# Patient Record
Sex: MALE | Race: AMERICAN INDIAN OR ALASKA NATIVE | NOT HISPANIC OR LATINO | Employment: OTHER | URBAN - METROPOLITAN AREA
[De-identification: names, ages, dates, MRNs, and addresses within clinical notes are randomized per-mention and may not be internally consistent; named-entity substitution may affect disease eponyms.]

---

## 2017-04-14 ENCOUNTER — SLEEP CENTER VISIT (OUTPATIENT)
Dept: SLEEP MEDICINE | Facility: MEDICAL CENTER | Age: 72
End: 2017-04-14
Payer: MEDICARE

## 2017-04-14 VITALS
TEMPERATURE: 97.7 F | WEIGHT: 190 LBS | BODY MASS INDEX: 30.53 KG/M2 | RESPIRATION RATE: 16 BRPM | DIASTOLIC BLOOD PRESSURE: 70 MMHG | OXYGEN SATURATION: 94 % | HEIGHT: 66 IN | HEART RATE: 64 BPM | SYSTOLIC BLOOD PRESSURE: 126 MMHG

## 2017-04-14 DIAGNOSIS — R06.83 SNORING: ICD-10-CM

## 2017-04-14 DIAGNOSIS — G47.30 SLEEP APNEA, UNSPECIFIED TYPE: ICD-10-CM

## 2017-04-14 DIAGNOSIS — G47.10 HYPERSOMNOLENCE: ICD-10-CM

## 2017-04-14 DIAGNOSIS — G47.34 NOCTURNAL HYPOXEMIA: ICD-10-CM

## 2017-04-14 PROCEDURE — 99204 OFFICE O/P NEW MOD 45 MIN: CPT | Performed by: INTERNAL MEDICINE

## 2017-04-14 RX ORDER — ZOLPIDEM TARTRATE 5 MG/1
TABLET ORAL
Qty: 3 TAB | Refills: 0 | Status: SHIPPED | OUTPATIENT
Start: 2017-04-14 | End: 2018-10-11

## 2017-04-14 NOTE — PROGRESS NOTES
CC:  Snoring, nocturnal hypoxemia and daytime somnolence, suspected sleep apnea hypopnea syndrome.    HPI:   Mr. Mata is a 71-year-old man referred by Dr. Fernando Tinoco to assist in evaluation and management of suspected sleep-disordered breathing.    He has a complicated cardiac history. He sustained an apparent myocardial infarction in 1989 and a second event in 2005 complicated by congestive heart failure and the need for a permanent transvenous pacemaker. He had recurring problems with atrial fibrillation and flutter and underwent transvascular ablation in 2016. That procedure was complicated by an episode of acute cholecystitis requiring cholecystectomy. During that hospitalization last year he was found to have low arterial oxygen saturations while sleeping. Nocturnal oximetry on October 20, 2016 demonstrated a lowest arterial oxygen saturation of 81% on room air and he spent about 7% of the night with a saturation below 90%. He was started on supplemental oxygen which he is using each night.    He has a regular sleep schedule at bedtime about 11 PM but he often takes 1-1.5 hours to fall asleep. He describes difficulty relaxing and racing thoughts. He awakens perhaps 3 times on an average night and arises at about 6 AM feeling a bit tired and groggy without regular morning headaches. He apparently does snore and he has some gasping respirations at night. He is sleepy during the day and may doze off while sitting in a chair and reading. He has not yet completed the sleep diary of her Hartley Sleepiness Scale. He drinks caffeinated beverages sparingly and does not use substances to induce sleep or to maintain wakefulness. He does not have symptoms suggesting parasomnia or restless leg syndrome.    His medical history is remarkable for the cardiac issues reviewed above as well as systemic arterial hypertension.        Patient Active Problem List    Diagnosis Date Noted   • AV block, complete (CMS-[facility]) 01/29/2013      Priority: High   • CAD (coronary artery disease) 06/07/2011     Priority: High   • Cardiac pacemaker in situ 06/07/2011     Priority: High   • Essential hypertension, benign 06/07/2011     Priority: High   • Other and unspecified hyperlipidemia 06/07/2011     Priority: High   • Arthritis 01/29/2013     Priority: Low   • BPH (benign prostatic hyperplasia) 01/29/2013     Priority: Low       Past Medical History   Diagnosis Date   • History of MI (myocardial infarction) 2005     Inferior MI   • Hypertension    • Hyperlipidemia    • AV block, complete (CMS-HCC)      Secondary to inferior MI, treated with PPM.   • Arthritis    • CAD (coronary artery disease) July 2012     MPI with infarct with minimal amount of ischemia in inferior wall, LVEF 61%.   • Atrial fibrillation (CMS-HCC)    • Back pain    • Coronary heart disease    • Depression    • Heart attack (CMS-HCC)    • Pneumonia    • Nasal drainage    • Rheumatoid arthritis (CMS-HCC)    • Chickenpox    • Malay measles    • Influenza    • Mumps    • Tonsillitis        Past Surgical History   Procedure Laterality Date   • Lid tightening  10/30/2012     Performed by Kojo Miranda M.D. at [facility]   • Other orthopedic surgery  2000     Back surgery by Dr. Gil.   • Pacemaker insertion  October 2005     Medtronic EnPulse [id number] implanted by [facility] [place].   • Laminotomy     • Pr remv 2nd cataract,corn-scler sectn     • Cholecystectomy     • Prostatectomy robotic     • Urology surgery     • Ventilator continuous         Family History   Problem Relation Age of Onset   • Heart Failure Father    • Sleep Apnea Neg Hx    • Cancer Sister    • Heart Disease Son    • No Known Problems Daughter    • No Known Problems Daughter    • No Known Problems Daughter        Social History     Social History   • Marital Status:      Spouse Name: N/A   • Number of Children: N/A   • Years of Education: N/A     Occupational History   • Not on file.     Social History Main  "Topics   • Smoking status: Never Smoker    • Smokeless tobacco: Never Used   • Alcohol Use: No   • Drug Use: No   • Sexual Activity: Not on file     Other Topics Concern   • Not on file     Social History Narrative       Current Outpatient Prescriptions   Medication Sig Dispense Refill   • Ibuprofen-Diphenhydramine HCl (ADVIL PM) 200-25 MG Cap Take  by mouth.     • zolpidem (AMBIEN) 5 MG Tab Take 1-2 tablets by mouth every evening as needed for insomnia. Bring to sleep study. 3 Tab 0   • lisinopril (PRINIVIL) 10 MG TABS Take 1 Tab by mouth every day. 30 Tab 11   • atorvastatin (LIPITOR) 40 MG TABS Take 1 Tab by mouth every day. 30 Tab 11   • aspirin 81 MG tablet Take 81 mg by mouth every day.     • tamsulosin (FLOMAX) 0.4 MG capsule Take 1 Cap by mouth every day. (Patient not taking: Reported on 4/14/2017) 30 Cap 11     No current facility-administered medications for this visit.    \"CURRENT RX\"      Allergies: Review of patient's allergies indicates no known allergies.      ROS  Positive for the sleep, cardiac, urologic and rheumatologic issues reviewed above. He wears corrective lenses but has no diplopia. He's had some loss of auditory acuity but no chronic rhinitis. He denies exertional dyspnea or regular cough. He's had rare episodes of heartburn without symptoms to suggest gastrointestinal blood loss. He has arthralgias and back pain that may interfere with sleep. All other aspects of the CPT review of systems process are negative.      Physical Exam:   /70 mmHg  Pulse 64  Temp(Src) 36.5 °C (97.7 °F)  Resp 16  Ht 1.676 m (5' 5.98\")  Wt 86.183 kg (190 lb)  BMI 30.68 kg/m2  SpO2 94%. Neck circumference is 18 inches.  Head and neck examination demonstrates no mucosal lesion, purulent drainage or evident polyps. The pharynx is crowded but benign with a Mallampati III presentation. The neck is supple without thyromegaly. On chest examination there are symmetrical bilateral breath sounds without rales, " wheezing or consolidation. On cardiac examination, the apical impulse and heart sounds are normal and the rhythm is regular at this time. There is no murmur, gallop or rub and no jugular venous distention. The abdomen is soft with active bowel sounds and no palpable hepatosplenomegaly, mass, guarding or rebound. The extremities show no clubbing, cyanosis or edema and no signs of deep venous thrombosis. There is no warmth, redness, tenderness or palpable venous cord in the calves. The skin is clear, warm and dry. There is no unusual peripheral lymphadenopathy. Peripheral pulses are palpable in all 4 extremities. On neurologic examination, cranial nerve function is intact, motor tone is symmetrical, and the patient is alert, oriented and responsive.       Problems:  1. Sleep apnea, unspecified type  He presents with snoring, excessive daytime somnolence and documented nocturnal hypoxemia. He has a crowded posterior pharyngeal airway, a neck circumference of 18 inches and a body mass index of 30.7. He has a history of systemic arterial hypertension, coronary artery disease and atrial fibrillation/flutter.  The clinical probability of sleep apnea hypopnea syndrome is significant. Accurate diagnosis and effective treatment are required not only to improve levels of daytime alertness but also to reduce the cardiac and neurologic risks associated with untreated sleep apnea particularly given his cardiac history. We have discussed diagnostic options including in-laboratory, attended polysomnography and home sleep testing. We have also discussed treatment options including airway pressurization, reconstructive otolaryngologic surgery, dental appliances and weight management.    2. Hypersomnolence  Probably related at least in part due to the suspected sleep-disordered breathing. His nightly time in bed is restricted and he does have significant insomnia as well as chronic pain and these problems may interfere with sleep  continuity.    3. Snoring    4. Nocturnal hypoxemia    5. Chronic insomnia, with both sleep onset and sleep maintenance elements.  This will be reassessed after his sleep breathing problems are evaluated and effectively treated.      Plan:   1. Polysomnogram, possible split night study.    2. Return visit afterwards to discuss test results and treatment options.    We appreciate the opportunity to assist in his care.

## 2017-04-14 NOTE — MR AVS SNAPSHOT
"Lorena Mata   2017 2:40 PM   Sleep Center Visit   MRN: [id number]    Department:  [facility]   Dept Phone:  [phone]    Description:  Male : 1945   Provider:  Jordin TIM M.D.           Reason for Visit     New Patient Evaluate BENEDICTO      Allergies as of 2017     No Known Allergies      You were diagnosed with     Sleep apnea, unspecified type   [8661206]       Hypersomnolence   [541878]       Snoring   [874465]       Nocturnal hypoxemia   [727237]         Vital Signs     Blood Pressure Pulse Temperature Respirations Height Weight    126/70 mmHg 64 36.5 °C (97.7 °F) 16 1.676 m (5' 5.98\") 86.183 kg (190 lb)    Body Mass Index Oxygen Saturation Smoking Status             30.68 kg/m2 94% Never Smoker          Basic Information     Date Of Birth Sex Race Ethnicity Preferred Language    1945 Male  or  Non- English      Your appointments     2017  8:00 PM   Sleep Study Diagnostic with SLEEP TECH   [facility] (--)    [address]  [place] [address]   [phone]            2017  1:00 PM   [facility] (--)    [address]  [place] [address]   [phone]              Problem List              ICD-10-CM Priority Class Noted - Resolved    CAD (coronary artery disease) I25.10 High  2011 - Present    Cardiac pacemaker in situ Z95.0 High  2011 - Present    Essential hypertension, benign I10 High  2011 - Present    Other and unspecified hyperlipidemia E78.5 High  2011 - Present    AV block, complete (CMS-HCC) I44.2 High  2013 - Present    Arthritis M19.90 Low  2013 - Present    BPH (benign prostatic hyperplasia) N40.0 Low  2013 - Present      Health Maintenance        Date Due Completion Dates    IMM DTaP/Tdap/Td Vaccine (1 - Tdap) 1966 ---    COLONOSCOPY 1997 ---    IMM ZOSTER " VACCINE 7/4/2007 ---    IMM PNEUMOCOCCAL 65+ (ADULT) LOW/MEDIUM RISK SERIES (1 of 2 - PCV13) 7/4/2012 ---            Current Immunizations     No immunizations on file.      Below and/or attached are the medications your provider expects you to take. Review all of your home medications and newly ordered medications with your provider and/or pharmacist. Follow medication instructions as directed by your provider and/or pharmacist. Please keep your medication list with you and share with your provider. Update the information when medications are discontinued, doses are changed, or new medications (including over-the-counter products) are added; and carry medication information at all times in the event of emergency situations     Allergies:  No Known Allergies          Medications  Valid as of: April 14, 2017 -  3:40 PM    Generic Name Brand Name Tablet Size Instructions for use    Aspirin (Tab) aspirin 81 MG Take 81 mg by mouth every day.        Atorvastatin Calcium (Tab) LIPITOR 40 MG Take 1 Tab by mouth every day.        Ibuprofen-Diphenhydramine HCl (Cap) Ibuprofen-Diphenhydramine HCl 200-25 MG Take  by mouth.        Lisinopril (Tab) PRINIVIL 10 MG Take 1 Tab by mouth every day.        Tamsulosin HCl (Cap) FLOMAX 0.4 MG Take 1 Cap by mouth every day.        Zolpidem Tartrate (Tab) AMBIEN 5 MG Take 1-2 tablets by mouth every evening as needed for insomnia. Bring to sleep study.        .                 Medicines prescribed today were sent to:     None      Medication refill instructions:       If your prescription bottle indicates you have medication refills left, it is not necessary to call your provider’s office. Please contact your pharmacy and they will refill your medication.    If your prescription bottle indicates you do not have any refills left, you may request refills at any time through one of the following ways: The online [employer] system (except Urgent Care), by calling your provider’s office, or by  asking your pharmacy to contact your provider’s office with a refill request. Medication refills are processed only during regular business hours and may not be available until the next business day. Your provider may request additional information or to have a follow-up visit with you prior to refilling your medication.   *Please Note: Medication refills are assigned a new Rx number when refilled electronically. Your pharmacy may indicate that no refills were authorized even though a new prescription for the same medication is available at the pharmacy. Please request the medicine by name with the pharmacy before contacting your provider for a refill.        Your To Do List     Future Labs/Procedures Complete By Expires    POLYSOMNOGRAPHY, 4 OR MORE  As directed 4/14/2018         [employer] Access Code: [email or web address]  Expires: 5/14/2017  3:40 PM    Your email address is not on file at [facility].  Email Addresses are required for you to sign up for [employer], please contact [phone] to verify your personal information and to provide your email address prior to attempting to register for [employer].    [place] [address]    [employer]  A secure, online tool to manage your health information     [employer]’s [employer]® is a secure, online tool that connects you to your personalized health information from the privacy of your home -- day or night - making it very easy for you to manage your healthcare. Once the activation process is completed, you can even access your medical information using the [employer] vandana, which is available for free in the Apple Vandana store or Google Play store.     To learn more about [employer], visit [email or web address]    There are two levels of access available (as shown below):   My Chart Features  [facility] Primary Care Doctor [facility]  Specialists [facility]  Urgent  Care Non-[facility] Primary Care Doctor   Email your healthcare team securely and privately 24/7 X X X     Manage appointments: schedule your next appointment; view details of past/upcoming appointments X      Request prescription refills. X      View recent personal medical records, including lab and immunizations X X X X   View health record, including health history, allergies, medications X X X X   Read reports about your outpatient visits, procedures, consult and ER notes X X X X   See your discharge summary, which is a recap of your hospital and/or ER visit that includes your diagnosis, lab results, and care plan X X  X     How to register for [employer]:  Once your e-mail address has been verified, follow the following steps to sign up for [employer].     1. Go to  [email or web address]  2. Click on the Sign Up Now box, which takes you to the New Member Sign Up page. You will need to provide the following information:  a. Enter your [employer] Access Code exactly as it appears at the top of this page. (You will not need to use this code after you’ve completed the sign-up process. If you do not sign up before the expiration date, you must request a new code.)   b. Enter your date of birth.   c. Enter your home email address.   d. Click Submit, and follow the next screen’s instructions.  3. Create a [employer] ID. This will be your [employer] login ID and cannot be changed, so think of one that is secure and easy to remember.  4. Create a [employer] password. You can change your password at any time.  5. Enter your Password Reset Question and Answer. This can be used at a later time if you forget your password.   6. Enter your e-mail address. This allows you to receive e-mail notifications when new information is available in [employer].  7. Click Sign Up. You can now view your health information.    For assistance activating your [employer] account, call [phone]

## 2017-06-21 ENCOUNTER — SLEEP STUDY (OUTPATIENT)
Dept: SLEEP MEDICINE | Facility: MEDICAL CENTER | Age: 72
End: 2017-06-21
Attending: INTERNAL MEDICINE
Payer: MEDICARE

## 2017-06-21 DIAGNOSIS — G47.34 NOCTURNAL HYPOXEMIA: ICD-10-CM

## 2017-06-21 DIAGNOSIS — G47.30 SLEEP APNEA, UNSPECIFIED TYPE: ICD-10-CM

## 2017-06-21 DIAGNOSIS — R06.83 SNORING: ICD-10-CM

## 2017-06-21 DIAGNOSIS — G47.10 HYPERSOMNOLENCE: ICD-10-CM

## 2017-06-21 PROCEDURE — 95811 POLYSOM 6/>YRS CPAP 4/> PARM: CPT | Performed by: INTERNAL MEDICINE

## 2017-06-21 NOTE — MR AVS SNAPSHOT
Lorena Mata   2017 8:00 PM   Sleep Study   MRN: [id number]    Department:  [facility]   Dept Phone:  [phone]    Description:  Male : 1945   Provider:  Jordin TIM M.D.           Allergies as of 2017     No Known Allergies      You were diagnosed with     Sleep apnea, unspecified type   [2069008]       Hypersomnolence   [877819]       Snoring   [267087]       Nocturnal hypoxemia   [837484]         Vital Signs     Smoking Status                   Never Smoker            Basic Information     Date Of Birth Sex Race Ethnicity Preferred Language    1945 Male  or  Non- English      Your appointments     2017  1:00 PM   Follow UP with  Sissy   [facility] (--)    [address] [place] [address]  [place] [id number] [phone]              Problem List              ICD-10-CM Priority Class Noted - Resolved    CAD (coronary artery disease) I25.10 High  2011 - Present    Cardiac pacemaker in situ Z95.0 High  2011 - Present    Essential hypertension, benign I10 High  2011 - Present    Other and unspecified hyperlipidemia E78.5 High  2011 - Present    AV block, complete (CMS-HCC) I44.2 High  2013 - Present    Arthritis M19.90 Low  2013 - Present    BPH (benign prostatic hyperplasia) N40.0 Low  2013 - Present      Health Maintenance        Date Due Completion Dates    IMM DTaP/Tdap/Td Vaccine (1 - Tdap) 1966 ---    COLONOSCOPY 1997 ---    IMM ZOSTER VACCINE 2007 ---    IMM PNEUMOCOCCAL 65+ (ADULT) LOW/MEDIUM RISK SERIES (1 of 2 - PCV13) 2012 ---            Current Immunizations     No immunizations on file.      Below and/or attached are the medications your provider expects you to take. Review all of your home medications and newly ordered medications with your provider and/or pharmacist. Follow medication instructions as directed by your provider and/or  pharmacist. Please keep your medication list with you and share with your provider. Update the information when medications are discontinued, doses are changed, or new medications (including over-the-counter products) are added; and carry medication information at all times in the event of emergency situations     Allergies:  No Known Allergies          Medications  Valid as of: June 22, 2017 -  6:29 AM    Generic Name Brand Name Tablet Size Instructions for use    Aspirin (Tab) aspirin 81 MG Take 81 mg by mouth every day.        Atorvastatin Calcium (Tab) LIPITOR 40 MG Take 1 Tab by mouth every day.        Ibuprofen-Diphenhydramine HCl (Cap) Ibuprofen-Diphenhydramine HCl 200-25 MG Take  by mouth.        Lisinopril (Tab) PRINIVIL 10 MG Take 1 Tab by mouth every day.        Tamsulosin HCl (Cap) FLOMAX 0.4 MG Take 1 Cap by mouth every day.        Zolpidem Tartrate (Tab) AMBIEN 5 MG Take 1-2 tablets by mouth every evening as needed for insomnia. Bring to sleep study.        .                 Medicines prescribed today were sent to:     None      Medication refill instructions:       If your prescription bottle indicates you have medication refills left, it is not necessary to call your provider’s office. Please contact your pharmacy and they will refill your medication.    If your prescription bottle indicates you do not have any refills left, you may request refills at any time through one of the following ways: The online [employer] system (except Urgent Care), by calling your provider’s office, or by asking your pharmacy to contact your provider’s office with a refill request. Medication refills are processed only during regular business hours and may not be available until the next business day. Your provider may request additional information or to have a follow-up visit with you prior to refilling your medication.   *Please Note: Medication refills are assigned a new Rx number when refilled electronically. Your  pharmacy may indicate that no refills were authorized even though a new prescription for the same medication is available at the pharmacy. Please request the medicine by name with the pharmacy before contacting your provider for a refill.           [employer] Access Code: [email or web address]  Expires: 7/22/2017  6:29 AM    Your email address is not on file at [facility].  Email Addresses are required for you to sign up for [employer], please contact [phone] to verify your personal information and to provide your email address prior to attempting to register for [employer].    Lorena [place] [address]    [employer]  A secure, online tool to manage your health information     [employer]’s [employer]® is a secure, online tool that connects you to your personalized health information from the privacy of your home -- day or night - making it very easy for you to manage your healthcare. Once the activation process is completed, you can even access your medical information using the [employer] vandana, which is available for free in the Apple Vandana store or Google Play store.     To learn more about [employer], visit [email or web address]    There are two levels of access available (as shown below):   My Chart Features  [facility] Primary Care Doctor [facility]  Specialists [facility]  Urgent  Care Non-[facility] Primary Care Doctor   Email your healthcare team securely and privately 24/7 X X X    Manage appointments: schedule your next appointment; view details of past/upcoming appointments X      Request prescription refills. X      View recent personal medical records, including lab and immunizations X X X X   View health record, including health history, allergies, medications X X X X   Read reports about your outpatient visits, procedures, consult and ER notes X X X X   See your discharge summary, which is a recap of your hospital and/or ER visit that includes your diagnosis, lab results, and care plan X X  X     How to register  for [employer]:  Once your e-mail address has been verified, follow the following steps to sign up for [employer].     1. Go to  [email or web address]  2. Click on the Sign Up Now box, which takes you to the New Member Sign Up page. You will need to provide the following information:  a. Enter your [employer] Access Code exactly as it appears at the top of this page. (You will not need to use this code after you’ve completed the sign-up process. If you do not sign up before the expiration date, you must request a new code.)   b. Enter your date of birth.   c. Enter your home email address.   d. Click Submit, and follow the next screen’s instructions.  3. Create a [employer] ID. This will be your [employer] login ID and cannot be changed, so think of one that is secure and easy to remember.  4. Create a [employer] password. You can change your password at any time.  5. Enter your Password Reset Question and Answer. This can be used at a later time if you forget your password.   6. Enter your e-mail address. This allows you to receive e-mail notifications when new information is available in [employer].  7. Click Sign Up. You can now view your health information.    For assistance activating your [employer] account, call [phone]

## 2017-06-22 ENCOUNTER — SLEEP CENTER VISIT (OUTPATIENT)
Dept: SLEEP MEDICINE | Facility: MEDICAL CENTER | Age: 72
End: 2017-06-22
Payer: MEDICARE

## 2017-06-22 VITALS
TEMPERATURE: 97.3 F | SYSTOLIC BLOOD PRESSURE: 124 MMHG | BODY MASS INDEX: 30.53 KG/M2 | HEART RATE: 62 BPM | DIASTOLIC BLOOD PRESSURE: 70 MMHG | WEIGHT: 190 LBS | OXYGEN SATURATION: 94 % | RESPIRATION RATE: 16 BRPM | HEIGHT: 66 IN

## 2017-06-22 DIAGNOSIS — G47.10 HYPERSOMNOLENCE: ICD-10-CM

## 2017-06-22 DIAGNOSIS — F51.04 CHRONIC INSOMNIA: ICD-10-CM

## 2017-06-22 DIAGNOSIS — G47.34 NOCTURNAL HYPOXEMIA: ICD-10-CM

## 2017-06-22 DIAGNOSIS — G47.33 OBSTRUCTIVE SLEEP APNEA SYNDROME: ICD-10-CM

## 2017-06-22 DIAGNOSIS — R06.83 SNORING: ICD-10-CM

## 2017-06-22 PROCEDURE — 99214 OFFICE O/P EST MOD 30 MIN: CPT | Performed by: INTERNAL MEDICINE

## 2017-06-22 RX ORDER — ZOLPIDEM TARTRATE 5 MG/1
5 TABLET ORAL NIGHTLY PRN
Qty: 30 TAB | Refills: 1 | Status: SHIPPED | OUTPATIENT
Start: 2017-06-22 | End: 2018-10-11

## 2017-06-22 NOTE — PROCEDURES
CLINICAL COMMENTS:  The patient underwent a split night polysomnogram with a CPAP titration using the standard montage for measurement of parameters of sleep, respiratory events, movement abnormalities, heart rate and rhythm. A microphone was used to monitor snoring.    ANALYSIS: The diagnostic recording time was 164.8 minutes with a sleep period of 159.5 minutes.  Total sleep time was 148.5 minutes with a sleep efficiency of 90.1%.  The sleep latency was 5.4 minutes, and REM latency was 118.0 minutes.  The patient had 71 arousals in total, for an arousal index of 28.7.        RESPIRATORY: The patient had 90 apneas in total.  Of these, 28 were obstructive apneas, and 62 were central apneas.  This resulted in an apnea index (AI) of 36.4.  The patient had 94 hypopneas in total, which resulted in a hypopnea index of 38.0.  The overall AHI was 74.4, while the AHI during REM was 10.9.  The supine AHI = 144.0.    OXIMETRY: Oxygen saturation monitoring showed a mean SpO2 of 91.8% for the diagnostic part of the study, with a minimum oxygen saturation of 83.0%.  Oxygen saturations were below 89% for 6.5% of sleep time.    CARDIAC: The highest heart rate for the first part of the study was 73.0 beats per minute.  The average heart rate during sleep was 59.8 bpm, while the highest heart rate was 69.0 bpm.    LIMB MOVEMENTS: There were a total of 56 periodic limb movements during sleep, of which 3 were PLMS arousals.  This resulted in a PLMS index of 22.6 and a PLMS arousal index of 1.2.    TREATMENT    Treatment recording time was 262.0 minutes with a total sleep time of 257.0min.  The patient had an arousal index of 9.8.      RESPIRATORY: The patient had 0 obstructive apneas, 3 central apneas, and 38 hypopneas for an overall AHI was 9.6.    OXIMETRY: The mean SpO2 during treatment was 91.3%, with a minimum oxygen saturation of 87.0%.        Interpretation:    Mr. Mata presented with snoring and excessive daytime  somnolence. He has a history of atrial fibrillation and flutter with a pacemaker as well as a history of coronary artery disease. This is a split-night study.    In the diagnostic phase there is mild fragmentation of sleep with a normal sleep efficiency but an elevated arousal and awakening index. Significant REM sleep time was recorded. There are periodic limb movements but the periodic limb movement with arousal index was only 1.2 events per hour. The apnea hypopnea index is 74.4 events per hour, including hypopnea and central apnea episodes with some obstructive apneas as well. The index climbs to 144 events per hour during supine sleep. The lowest arterial oxygen saturation is 83% on room air and he spends about 23% of the diagnostic time with a saturation below 90%.    In the treatment phase of the study there is improvement in sleep continuity and the periodic limb movements disappear. CPAP was adjusted across a pressure range of 4-9 cm water. Central apnea episodes resolved in the latter stages of the pressure titration. Scattered hypopnea episodes were noted during supine and REM sleep throughout the titration. At a CPAP pressure of 8 cm water the apnea hypopnea index was 3.5 events per hour with a mean arterial oxygen saturation of 92% and a minimum saturation of 87%. That stage in the titration included 34 minutes of REM sleep time, 86 minutes of non-REM sleep time, and was done in the supine body position.    Assessment:  Very severe sleep apnea hypopnea with an apnea hypopnea index of 74.4 events per hour, including a significant number of central apnea episodes. Significant nocturnal hypoxemia with a lowest arterial oxygen saturation of 83% on room air. Mild fragmentation of sleep, related in part to the breathing events, and improved on treatment. There is an excellent response to CPAP therapy.    Recommendations:  CPAP at 8-9 cm water pressure should be effective. With the persisting occasional  hypopnea episodes in the final pressure stage, auto titrating CPAP with a pressure range of perhaps 7-12 cm water might be considered as an alternative. He did best with a medium Eson mask and heated humidification.

## 2017-06-22 NOTE — MR AVS SNAPSHOT
"Lorena Mata   2017 1:00 PM   Sleep Center Visit   MRN: [id number]    Department:  [facility]   Dept Phone:  [phone]    Description:  Male : 1945   Provider:  Jordin TIM M.D.           Reason for Visit     Results Split-Night Sleep Study      Allergies as of 2017     No Known Allergies      You were diagnosed with     Obstructive sleep apnea syndrome   [951694]       Hypersomnolence   [022192]       Snoring   [798450]       Nocturnal hypoxemia   [607179]       Chronic insomnia   [921198]         Vital Signs     Blood Pressure Pulse Temperature Respirations Height Weight    124/70 mmHg 62 36.3 °C (97.3 °F) 16 1.676 m (5' 6\") 86.183 kg (190 lb)    Body Mass Index Oxygen Saturation Smoking Status             30.68 kg/m2 94% Never Smoker          Basic Information     Date Of Birth Sex Race Ethnicity Preferred Language    1945 Male  or  Non- English      Your appointments     Aug 22, 2017  2:20 PM   Follow UP with TITUS Faustin   [facility] (--)    [address] [place] [id number]   [phone]              Problem List              ICD-10-CM Priority Class Noted - Resolved    CAD (coronary artery disease) I25.10 High  2011 - Present    Cardiac pacemaker in situ Z95.0 High  2011 - Present    Essential hypertension, benign I10 High  2011 - Present    Other and unspecified hyperlipidemia E78.5 High  2011 - Present    AV block, complete (CMS-HCC) I44.2 High  2013 - Present    Arthritis M19.90 Low  2013 - Present    BPH (benign prostatic hyperplasia) N40.0 Low  2013 - Present      Health Maintenance        Date Due Completion Dates    IMM DTaP/Tdap/Td Vaccine (1 - Tdap) 1966 ---    COLONOSCOPY 1997 ---    IMM ZOSTER VACCINE 2007 ---    IMM PNEUMOCOCCAL 65+ (ADULT) LOW/MEDIUM RISK SERIES (1 of 2 - PCV13) 2012 ---            Current " Immunizations     No immunizations on file.      Below and/or attached are the medications your provider expects you to take. Review all of your home medications and newly ordered medications with your provider and/or pharmacist. Follow medication instructions as directed by your provider and/or pharmacist. Please keep your medication list with you and share with your provider. Update the information when medications are discontinued, doses are changed, or new medications (including over-the-counter products) are added; and carry medication information at all times in the event of emergency situations     Allergies:  No Known Allergies          Medications  Valid as of: June 22, 2017 -  1:40 PM    Generic Name Brand Name Tablet Size Instructions for use    Aspirin (Tab) aspirin 81 MG Take 81 mg by mouth every day.        Atorvastatin Calcium (Tab) LIPITOR 40 MG Take 1 Tab by mouth every day.        Ibuprofen-Diphenhydramine HCl (Cap) Ibuprofen-Diphenhydramine HCl 200-25 MG Take  by mouth.        Lisinopril (Tab) PRINIVIL 10 MG Take 1 Tab by mouth every day.        Tamsulosin HCl (Cap) FLOMAX 0.4 MG Take 1 Cap by mouth every day.        Zolpidem Tartrate (Tab) AMBIEN 5 MG Take 1-2 tablets by mouth every evening as needed for insomnia. Bring to sleep study.        Zolpidem Tartrate (Tab) AMBIEN 5 MG Take 1 Tab by mouth at bedtime as needed for Sleep (Insomnia).        .                 Medicines prescribed today were sent to:     None      Medication refill instructions:       If your prescription bottle indicates you have medication refills left, it is not necessary to call your provider’s office. Please contact your pharmacy and they will refill your medication.    If your prescription bottle indicates you do not have any refills left, you may request refills at any time through one of the following ways: The online MT[employer]or system (except Urgent Care), by calling your provider’s office, or by asking your pharmacy to  contact your provider’s office with a refill request. Medication refills are processed only during regular business hours and may not be available until the next business day. Your provider may request additional information or to have a follow-up visit with you prior to refilling your medication.   *Please Note: Medication refills are assigned a new Rx number when refilled electronically. Your pharmacy may indicate that no refills were authorized even though a new prescription for the same medication is available at the pharmacy. Please request the medicine by name with the pharmacy before contacting your provider for a refill.        Instructions    Please bring the CPAP machine data chip with you to the next office visit.          [employer] Access Code: [email or web address]  Expires: 7/22/2017  6:29 AM    Your email address is not on file at [facility].  Email Addresses are required for you to sign up for [employer], please contact [phone] to verify your personal information and to provide your email address prior to attempting to register for [employer].    [place] [address]    [employer]  A secure, online tool to manage your health information     [facility]’s [employer]® is a secure, online tool that connects you to your personalized health information from the privacy of your home -- day or night - making it very easy for you to manage your healthcare. Once the activation process is completed, you can even access your medical information using the [employer] vandana, which is available for free in the Apple Vandana store or Google Play store.     To learn more about [employer], visit [email or web address]    There are two levels of access available (as shown below):   My Chart Features  [facility] Primary Care Doctor [facility]  Specialists [facility]  Urgent  Care Non-[facility] Primary Care Doctor   Email your healthcare team securely and privately 24/7 X X X    Manage appointments: schedule your next appointment;  view details of past/upcoming appointments X      Request prescription refills. X      View recent personal medical records, including lab and immunizations X X X X   View health record, including health history, allergies, medications X X X X   Read reports about your outpatient visits, procedures, consult and ER notes X X X X   See your discharge summary, which is a recap of your hospital and/or ER visit that includes your diagnosis, lab results, and care plan X X  X     How to register for [employer]:  Once your e-mail address has been verified, follow the following steps to sign up for [employer].     1. Go to  [email or web address]  2. Click on the Sign Up Now box, which takes you to the New Member Sign Up page. You will need to provide the following information:  a. Enter your [employer] Access Code exactly as it appears at the top of this page. (You will not need to use this code after you’ve completed the sign-up process. If you do not sign up before the expiration date, you must request a new code.)   b. Enter your date of birth.   c. Enter your home email address.   d. Click Submit, and follow the next screen’s instructions.  3. Create a [employer] ID. This will be your [employer] login ID and cannot be changed, so think of one that is secure and easy to remember.  4. Create a [employer] password. You can change your password at any time.  5. Enter your Password Reset Question and Answer. This can be used at a later time if you forget your password.   6. Enter your e-mail address. This allows you to receive e-mail notifications when new information is available in [employer].  7. Click Sign Up. You can now view your health information.    For assistance activating your [employer] account, call [phone]

## 2017-06-25 NOTE — PROGRESS NOTES
CC:  Snoring, nocturnal hypoxemia and daytime somnolence, suspected sleep apnea hypopnea syndrome.    HPI:    Mr. Mata is a 71-year-old man referred by Dr. Fernando Tinoco to assist in evaluation and management of suspected sleep-disordered breathing. He was initially evaluated on April 14, 2017 and returns to review the results of a split-night polysomnogram.    He has a complicated cardiac history. He sustained an apparent myocardial infarction in 1989 and a second event in 2005 complicated by congestive heart failure and the need for a permanent transvenous pacemaker. He had recurring problems with atrial fibrillation and flutter and underwent transvascular ablation in 2016. That procedure was complicated by an episode of acute cholecystitis requiring cholecystectomy. During that hospitalization last year he was found to have low arterial oxygen saturations while sleeping. Nocturnal oximetry on October 20, 2016 demonstrated a lowest arterial oxygen saturation of 81% on room air and he spent about 7% of the night with a saturation below 90%. He was started on supplemental oxygen which he is using each night.    He has a regular sleep schedule at bedtime about 11 PM but he often takes 1-1.5 hours to fall asleep. He describes difficulty relaxing and racing thoughts. He awakens perhaps 3 times on an average night and arises at about 6 AM feeling a bit tired and groggy without regular morning headaches. He apparently does snore and he has some gasping respirations at night. He is sleepy during the day and may doze off while sitting in a chair and reading. He has not yet completed the sleep diary of her [place] Sleepiness Scale. He drinks caffeinated beverages sparingly and does not use substances to induce sleep or to maintain wakefulness. He does not have symptoms suggesting parasomnia or restless leg syndrome.    His medical history is remarkable for the cardiac issues reviewed above as well as systemic arterial  hypertension.    The polysomnogram dated June 21, 2017 demonstrates mild fragmentation of sleep and an apnea hypopnea index of 74.4 events per hour, including both hypopnea and central apnea episodes as well as some obstructive apneas.  The lowest arterial oxygen saturation was 83% on room air and he spent 23% of the diagnostic time with a saturation below 90%.  In the treatment phase of the study there was improved continuity of sleep.  CPAP was adjusted across a pressure range of 4-9 cm water.  In the final 2 pressure stages, the apnea hypopnea index was 4 events per hour with a lowest arterial oxygen saturation of 87% on room air.  Those stages included 34 minutes of REM sleep time and wasn't 2 hours of non-REM sleep time and were done in the supine body position.            Patient Active Problem List    Diagnosis Date Noted   • AV block, complete (CMS-HCC) 01/29/2013     Priority: High   • CAD (coronary artery disease) 06/07/2011     Priority: High   • Cardiac pacemaker in situ 06/07/2011     Priority: High   • Essential hypertension, benign 06/07/2011     Priority: High   • Other and unspecified hyperlipidemia 06/07/2011     Priority: High   • Arthritis 01/29/2013     Priority: Low   • BPH (benign prostatic hyperplasia) 01/29/2013     Priority: Low       Past Medical History   Diagnosis Date   • History of MI (myocardial infarction) 2005     Inferior MI   • Hypertension    • Hyperlipidemia    • AV block, complete (CMS-HCC)      Secondary to inferior MI, treated with PPM.   • Arthritis    • CAD (coronary artery disease) July 2012     MPI with infarct with minimal amount of ischemia in inferior wall, LVEF 61%.   • Atrial fibrillation (CMS-[facility])    • Back pain    • Coronary heart disease    • Depression    • Heart attack (CMS-[facility])    • Pneumonia    • Nasal drainage    • Rheumatoid arthritis (CMS-[facility])    • Chickenpox    • English measles    • Influenza    • Mumps    • Tonsillitis        Past Surgical History  "  Procedure Laterality Date   • Lid tightening  10/30/2012     Performed by Kojo Miranda M.D. at [facility]   • Other orthopedic surgery  2000     Back surgery by Dr. Gil.   • Pacemaker insertion  October 2005     Medtronic EnPulse [id number] implanted by [facility].   • Laminotomy     • Pr remv 2nd cataract,corn-scler sectn     • Cholecystectomy     • Prostatectomy robotic     • Urology surgery     • Ventilator continuous         Family History   Problem Relation Age of Onset   • Heart Failure Father    • Sleep Apnea Neg Hx    • Cancer Sister    • Heart Disease Son    • No Known Problems Daughter    • No Known Problems Daughter    • No Known Problems Daughter        Social History     Social History   • Marital Status:      Spouse Name: N/A   • Number of Children: N/A   • Years of Education: N/A     Occupational History   • Not on file.     Social History Main Topics   • Smoking status: Never Smoker    • Smokeless tobacco: Never Used   • Alcohol Use: No   • Drug Use: No   • Sexual Activity: Not on file     Other Topics Concern   • Not on file     Social History Narrative       Current Outpatient Prescriptions   Medication Sig Dispense Refill   • zolpidem (AMBIEN) 5 MG Tab Take 1 Tab by mouth at bedtime as needed for Sleep (Insomnia). 30 Tab 1   • Ibuprofen-Diphenhydramine HCl (ADVIL PM) 200-25 MG Cap Take  by mouth.     • lisinopril (PRINIVIL) 10 MG TABS Take 1 Tab by mouth every day. 30 Tab 11   • atorvastatin (LIPITOR) 40 MG TABS Take 1 Tab by mouth every day. 30 Tab 11   • aspirin 81 MG tablet Take 81 mg by mouth every day.     • zolpidem (AMBIEN) 5 MG Tab Take 1-2 tablets by mouth every evening as needed for insomnia. Bring to sleep study. (Patient not taking: Reported on 6/22/2017) 3 Tab 0   • tamsulosin (FLOMAX) 0.4 MG capsule Take 1 Cap by mouth every day. (Patient not taking: Reported on 4/14/2017) 30 Cap 11     No current facility-administered medications for this visit.    \"CURRENT " "RX\"      Allergies: Review of patient's allergies indicates no known allergies.      ROS  Unchanged from prior and positive for the sleep, cardiac, urologic and rheumatologic issues reviewed above. He wears corrective lenses but has no diplopia. He's had some loss of auditory acuity but no chronic rhinitis. He denies exertional dyspnea or regular cough. He's had rare episodes of heartburn without symptoms to suggest gastrointestinal blood loss. He has arthralgias and back pain that may interfere with sleep. All other aspects of the CPT review of systems process are negative.      Physical Exam:   /70 mmHg  Pulse 62  Temp(Src) 36.3 °C (97.3 °F)  Resp 16  Ht 1.676 m (5' 6\")  Wt 86.183 kg (190 lb)  BMI 30.68 kg/m2  SpO2 94%   Head and neck examination demonstrates no mucosal lesion, purulent drainage or evident polyps. The pharynx is benign with a Mallampati III presentation. The neck is supple without thyromegaly. On chest examination there are symmetrical bilateral breath sounds without rales, wheezing or consolidation. On cardiac examination, the apical impulse and heart sounds are normal and the rhythm is regular. There is no murmur, gallop or rub and no jugular venous distention. The abdomen is soft with active bowel sounds and no palpable hepatosplenomegaly, mass, guarding or rebound. The extremities show no clubbing, cyanosis or edema and no signs of deep venous thrombosis. There is no warmth, redness, tenderness or palpable venous cord in the calves. The skin is clear, warm and dry. There is no unusual peripheral lymphadenopathy. Peripheral pulses are palpable in all 4 extremities. On neurologic examination, cranial nerve function is intact, motor tone is symmetrical, and the patient is alert, oriented and responsive.       Problems:  1. Obstructive sleep apnea syndrome  He has very severe sleep apnea hypopnea syndrome with an apnea hypopnea index is 74.4 events per hour and a lowest arterial " "oxygen saturation of 83% on room air.  Despite the number of central apnea episodes seen during the diagnostic phaseof his split-night study, he had an excellent response to CPAP therapy.  We've discussed treatment options including airway pressurization, reconstructive otolaryngologic surgery, dental appliances and weight management.  I think that CPAP is the only therapy that is likely to be effective for him.    2. Hypersomnolence  Probably related at least in part due to the suspected sleep-disordered breathing. His nightly time in bed is restricted and he does have significant insomnia as well as chronic pain and these problems may interfere with sleep continuity.    3. Snoring    4. Nocturnal hypoxemia    5. Chronic insomnia  With both sleep onset and sleep maintenance elements.  This will be reassessed after his sleep breathing problems are evaluated and effectively treated. He is appropriate the concerned about his insomnia worsening as he acclimated to CPAP therapy.  As an interim measure I have given him a prescription for zolpidem 5 mg tablets to be used nightly as needed during the transition.  We talked about potential side effects that medication including daytime grogginess, unusual nocturnal behavior and the issues with long-term use.  - DME CPAP  - zolpidem (AMBIEN) 5 MG Tab; Take 1 Tab by mouth at bedtime as needed for Sleep (Insomnia).  Dispense: 30 Tab; Refill: 1    - \"dx with associated meds/order\"      Plan:   He is appropriate the concerned about his insomnia worsening as he acclimated to CPAP therapy.  As an interim measure I have given him a prescription for zolpidem 5 mg tablets to be used nightly as needed during the transition.  We talked about potential side effects that medication including daytime grogginess, unusual nocturnal behavior and the issues with long-term use.      "

## 2017-07-27 ENCOUNTER — TELEPHONE (OUTPATIENT)
Dept: PULMONOLOGY | Facility: HOSPICE | Age: 72
End: 2017-07-27

## 2017-08-22 ENCOUNTER — SLEEP CENTER VISIT (OUTPATIENT)
Dept: SLEEP MEDICINE | Facility: MEDICAL CENTER | Age: 72
End: 2017-08-22
Payer: MEDICARE

## 2017-08-22 VITALS
DIASTOLIC BLOOD PRESSURE: 64 MMHG | HEART RATE: 102 BPM | SYSTOLIC BLOOD PRESSURE: 110 MMHG | WEIGHT: 193 LBS | HEIGHT: 66 IN | OXYGEN SATURATION: 93 % | TEMPERATURE: 97.7 F | BODY MASS INDEX: 31.02 KG/M2 | RESPIRATION RATE: 16 BRPM

## 2017-08-22 DIAGNOSIS — G47.33 OSA (OBSTRUCTIVE SLEEP APNEA): ICD-10-CM

## 2017-08-22 PROCEDURE — 99213 OFFICE O/P EST LOW 20 MIN: CPT | Performed by: NURSE PRACTITIONER

## 2017-08-22 NOTE — PROGRESS NOTES
Chief Complaint   Patient presents with   • Apnea         HPI:  This is a 72 y.o. male with a history of obstructive sleep apnea.  Polysomnogram indicates AHI 74.4 and minimum saturation 83%. The patient is compliant with APAP 7-12 centimeters. Compliance dated 7/23-8/21/17 indicates 93% usage for an average of 5.5 hours. The AHI has been reduced to 7.8. The average pressure is 12 cm. He feels he will likely tolerate a little bit higher pressure. The patient reports waking up feeling rested. He is a little groggy in the morning but had been taking a sleeping pill, zolpidem. He is stops this medication as been using the melatonin.      Past Medical History   Diagnosis Date   • History of MI (myocardial infarction) 2005     Inferior MI   • Hypertension    • Hyperlipidemia    • AV block, complete (CMS-HCC)      Secondary to inferior MI, treated with PPM.   • Arthritis    • CAD (coronary artery disease) July 2012     MPI with infarct with minimal amount of ischemia in inferior wall, LVEF 61%.   • Atrial fibrillation (CMS-HCC)    • Back pain    • Coronary heart disease    • Depression    • Heart attack (CMS-HCC)    • Pneumonia    • Nasal drainage    • Rheumatoid arthritis (CMS-HCC)    • Chickenpox    • Urdu measles    • Influenza    • Mumps    • Tonsillitis    • BENEDICTO (obstructive sleep apnea) 8/22/2017     AHI 74.4, minimum saturation 83%, on APAP 7-15 cm.       Past Surgical History   Procedure Laterality Date   • Lid tightening  10/30/2012     Performed by Kojo Miranda M.D. at [facility]   • Other orthopedic surgery  2000     Back surgery by Dr. Gil.   • Pacemaker insertion  October 2005     Medtronic EnPulse [id number] implanted by [facility].   • Laminotomy     • Pr remv 2nd cataract,corn-scler sectn     • Cholecystectomy     • Prostatectomy robotic     • Urology surgery     • Ventilator continuous         Social History   Substance Use Topics   • Smoking status: Never Smoker    • Smokeless tobacco: Never  "Used   • Alcohol Use: No       ROS:   Constitutional: Denies fevers, chills, sweats, fatigue, and weight loss.  Eyes: Denies glasses.  Ears/nose/mouth/throat: Denies injury.  Cardiovascular: Denies chest pain, tightness.  Respiratory: Denies shortness of breath, cough, sputum, wheezing, hemoptysis.  GI: Denies heartburn, difficulty swallowing, nausea, and vomiting.  Neurological: Denies frequent headaches, dizziness, weakness.  Sleep: See history of present illness.    Vitals:  Filed Vitals:    08/22/17 1351   Height: 1.676 m (5' 5.98\")   Weight: 87.544 kg (193 lb)   Weight % change since last entry.: 0 %   BP: 110/64   Pulse: 102   BMI (Calculated): 31.17   Resp: 16   Temp: 36.5 °C (97.7 °F)   O2 sat % room air: 93 %     Allergies:  Review of patient's allergies indicates no known allergies.    Medications.  Current Outpatient Prescriptions   Medication Sig Dispense Refill   • zolpidem (AMBIEN) 5 MG Tab Take 1 Tab by mouth at bedtime as needed for Sleep (Insomnia). 30 Tab 1   • Ibuprofen-Diphenhydramine HCl (ADVIL PM) 200-25 MG Cap Take  by mouth.     • zolpidem (AMBIEN) 5 MG Tab Take 1-2 tablets by mouth every evening as needed for insomnia. Bring to sleep study. (Patient not taking: Reported on 6/22/2017) 3 Tab 0   • lisinopril (PRINIVIL) 10 MG TABS Take 1 Tab by mouth every day. 30 Tab 11   • tamsulosin (FLOMAX) 0.4 MG capsule Take 1 Cap by mouth every day. (Patient not taking: Reported on 4/14/2017) 30 Cap 11   • atorvastatin (LIPITOR) 40 MG TABS Take 1 Tab by mouth every day. 30 Tab 11   • aspirin 81 MG tablet Take 81 mg by mouth every day.       No current facility-administered medications for this visit.       PHYSICAL EXAM:  Appearance: Well-developed, well-nourished, no acute distress.  Eyes. PERRL.  Hearing: Grossly intact.  Oropharynx: Tongue normal, posterior pharynx without erythema or exudate.  Respiratory effort: No intercostal retractions or use of accessory muscles.  Lung auscultation: No crackles, " wheezing.  Heart auscultation: No murmur, gallop, or rub. Regular rate and rhythm.  Extremities: No cyanosis or edema.  Gait and Station: Normal  Orientation: Oriented to time, place, and person.    Assessment:  1. BENEDICTO (obstructive sleep apnea)  DME OTHER         Plan:  1. Change APAP 7-15 cm.  2. Mask fit today.  3. Clean equipment weekly and replace supplies as allowed by insurance.      Return in about 6 months (around 2/22/2018) for With PRANAV Jacob.

## 2017-08-22 NOTE — MR AVS SNAPSHOT
"Lorena Mata   2017 2:20 PM   Sleep Center Visit   MRN: [id number]    Department:  [facility]   Dept Phone:  [phone]    Description:  Male : 1945   Provider:  TITUS Faustin           Reason for Visit     Apnea           Allergies as of 2017     No Known Allergies      You were diagnosed with     BENEDICTO (obstructive sleep apnea)   [[id number]]         Vital Signs     Blood Pressure Pulse Temperature Respirations Height Weight    110/64 mmHg 102 36.5 °C (97.7 °F) 16 1.676 m (5' 5.98\") 87.544 kg (193 lb)    Body Mass Index Oxygen Saturation Smoking Status             31.17 kg/m2 93% Never Smoker          Basic Information     Date Of Birth Sex Race Ethnicity Preferred Language    1945 Male  or  Non- English      Your appointments     2018  2:00 PM   Follow UP with TITUS Faustin   [facility] (--)    [address] [place] [id number]   [phone]              Problem List              ICD-10-CM Priority Class Noted - Resolved    CAD (coronary artery disease) I25.10 High  2011 - Present    Cardiac pacemaker in situ Z95.0 High  2011 - Present    Essential hypertension, benign I10 High  2011 - Present    Other and unspecified hyperlipidemia E78.5 High  2011 - Present    AV block, complete (CMS-HCC) I44.2 High  2013 - Present    Arthritis M19.90 Low  2013 - Present    BPH (benign prostatic hyperplasia) N40.0 Low  2013 - Present    BENEDICTO (obstructive sleep apnea) G47.33   2017 - Present      Health Maintenance        Date Due Completion Dates    IMM DTaP/Tdap/Td Vaccine (1 - Tdap) 1966 ---    COLONOSCOPY 1997 ---    IMM ZOSTER VACCINE 2007 ---    IMM PNEUMOCOCCAL 65+ (ADULT) LOW/MEDIUM RISK SERIES (1 of 2 - PCV13) 2012 ---    IMM INFLUENZA (1) 2017 ---            Current Immunizations     No immunizations on file.      Below and/or " attached are the medications your provider expects you to take. Review all of your home medications and newly ordered medications with your provider and/or pharmacist. Follow medication instructions as directed by your provider and/or pharmacist. Please keep your medication list with you and share with your provider. Update the information when medications are discontinued, doses are changed, or new medications (including over-the-counter products) are added; and carry medication information at all times in the event of emergency situations     Allergies:  No Known Allergies          Medications  Valid as of: August 22, 2017 -  2:22 PM    Generic Name Brand Name Tablet Size Instructions for use    Aspirin (Tab) aspirin 81 MG Take 81 mg by mouth every day.        Atorvastatin Calcium (Tab) LIPITOR 40 MG Take 1 Tab by mouth every day.        Ibuprofen-Diphenhydramine HCl (Cap) Ibuprofen-Diphenhydramine HCl 200-25 MG Take  by mouth.        Lisinopril (Tab) PRINIVIL 10 MG Take 1 Tab by mouth every day.        Tamsulosin HCl (Cap) FLOMAX 0.4 MG Take 1 Cap by mouth every day.        Zolpidem Tartrate (Tab) AMBIEN 5 MG Take 1-2 tablets by mouth every evening as needed for insomnia. Bring to sleep study.        Zolpidem Tartrate (Tab) AMBIEN 5 MG Take 1 Tab by mouth at bedtime as needed for Sleep (Insomnia).        .                 Medicines prescribed today were sent to:     None      Medication refill instructions:       If your prescription bottle indicates you have medication refills left, it is not necessary to call your provider’s office. Please contact your pharmacy and they will refill your medication.    If your prescription bottle indicates you do not have any refills left, you may request refills at any time through one of the following ways: The online [employer] system (except Urgent Care), by calling your provider’s office, or by asking your pharmacy to contact your provider’s office with a refill request.  Medication refills are processed only during regular business hours and may not be available until the next business day. Your provider may request additional information or to have a follow-up visit with you prior to refilling your medication.   *Please Note: Medication refills are assigned a new Rx number when refilled electronically. Your pharmacy may indicate that no refills were authorized even though a new prescription for the same medication is available at the pharmacy. Please request the medicine by name with the pharmacy before contacting your provider for a refill.           Rightware Oy Access Code: [email or web address]  Expires: 9/21/2017  2:22 PM    Your email address is not on file at [facility].  Email Addresses are required for you to sign up for Rightware Oy, please contact [phone] to verify your personal information and to provide your email address prior to attempting to register for Rightware Oy.    Lorena Mata  [address]   [place] [address]    Rightware Oy  A secure, online tool to manage your health information     [facility]’s Rightware Oy® is a secure, online tool that connects you to your personalized health information from the privacy of your home -- day or night - making it very easy for you to manage your healthcare. Once the activation process is completed, you can even access your medical information using the Rightware Oy vandana, which is available for free in the Apple Vandana store or Google Play store.     To learn more about Rightware Oy, visit [email or web address]    There are two levels of access available (as shown below):   My Chart Features  [facility] Primary Care Doctor [facility]  Specialists [facility]  Urgent  Care Non-[facility] Primary Care Doctor   Email your healthcare team securely and privately 24/7 X X X    Manage appointments: schedule your next appointment; view details of past/upcoming appointments X      Request prescription refills. X      View recent personal medical records, including lab and  immunizations X X X X   View health record, including health history, allergies, medications X X X X   Read reports about your outpatient visits, procedures, consult and ER notes X X X X   See your discharge summary, which is a recap of your hospital and/or ER visit that includes your diagnosis, lab results, and care plan X X  X     How to register for Daily Pic:  Once your e-mail address has been verified, follow the following steps to sign up for Daily Pic.     1. Go to  [email or web address]  2. Click on the Sign Up Now box, which takes you to the New Member Sign Up page. You will need to provide the following information:  a. Enter your Daily Pic Access Code exactly as it appears at the top of this page. (You will not need to use this code after you’ve completed the sign-up process. If you do not sign up before the expiration date, you must request a new code.)   b. Enter your date of birth.   c. Enter your home email address.   d. Click Submit, and follow the next screen’s instructions.  3. Create a Daily Pic ID. This will be your Daily Pic login ID and cannot be changed, so think of one that is secure and easy to remember.  4. Create a Daily Pic password. You can change your password at any time.  5. Enter your Password Reset Question and Answer. This can be used at a later time if you forget your password.   6. Enter your e-mail address. This allows you to receive e-mail notifications when new information is available in Daily Pic.  7. Click Sign Up. You can now view your health information.    For assistance activating your Daily Pic account, call [phone]

## 2017-08-22 NOTE — PATIENT INSTRUCTIONS
1. Change APAP 7-15 cm.  2. Mask fit today.  3. Clean equipment weekly and replace supplies as allowed by insurance.

## 2018-04-06 ENCOUNTER — SLEEP CENTER VISIT (OUTPATIENT)
Dept: SLEEP MEDICINE | Facility: MEDICAL CENTER | Age: 73
End: 2018-04-06
Payer: MEDICARE

## 2018-04-06 VITALS
OXYGEN SATURATION: 94 % | RESPIRATION RATE: 16 BRPM | SYSTOLIC BLOOD PRESSURE: 134 MMHG | BODY MASS INDEX: 32.14 KG/M2 | DIASTOLIC BLOOD PRESSURE: 68 MMHG | HEIGHT: 66 IN | HEART RATE: 62 BPM | WEIGHT: 200 LBS

## 2018-04-06 DIAGNOSIS — G47.33 OSA (OBSTRUCTIVE SLEEP APNEA): ICD-10-CM

## 2018-04-06 DIAGNOSIS — G89.29 OTHER CHRONIC PAIN: ICD-10-CM

## 2018-04-06 DIAGNOSIS — Z95.0 CARDIAC PACEMAKER IN SITU: ICD-10-CM

## 2018-04-06 DIAGNOSIS — F51.04 CHRONIC INSOMNIA: ICD-10-CM

## 2018-04-06 PROCEDURE — 99214 OFFICE O/P EST MOD 30 MIN: CPT | Performed by: NURSE PRACTITIONER

## 2018-04-06 NOTE — PROGRESS NOTES
Chief Complaint   Patient presents with   • Apnea     APAP 7-15 CM H2O         HPI: This patient is a 72 y.o. male, who presents for six-month follow-up of obstructive sleep apnea.     PSG indicates severe obstructive sleep apnea with an AHI of 74, minimum oxygen saturation of 83 %. he is compliant with auto CPAP 7-15 cm H2O. He request a mask fit today. He is using a nasal mask which leaks excessively. Compliance download over the past 30 days indicates 87 % compliance, average use of 4 hours 20 minutes per night, AHI of 8.6. He tolerates CPAP. He understands the importance of it with his cardiac history. He feels the pressure increase after his last visit was too much and is requesting pressures be decreased back to previous pressure of 7-12 cm H2O. Patient tells me he has some chronic pain issues to keep him awake at night. He tells me he is a very restless sleeper even when pain is not bothering him. He is requesting a sleeping aid. He has had Ambien in the past with subjective benefit. I reviewed with him the side effects of long-term Ambien and advised against this. Sleep hygiene was reviewed with him. Encouraged him to follow up with his PCP regarding pain. If this is the cause for his insomnia his pain needs to be treated appropriately.    Patient has complicated medical history. MI in 1989 and 2005, CHF, S/P pacemaker, A. fib/flutter S/P ablation 2016.    Past Medical History:   Diagnosis Date   • Arthritis    • Atrial fibrillation (CMS-HCC)    • AV block, complete (CMS-HCC)     Secondary to inferior MI, treated with PPM.   • Back pain    • CAD (coronary artery disease) July 2012    MPI with infarct with minimal amount of ischemia in inferior wall, LVEF 61%.   • Chickenpox    • Coronary heart disease    • Depression    • Bhutanese measles    • Heart attack    • History of MI (myocardial infarction) 2005    Inferior MI   • Hyperlipidemia    • Hypertension    • Influenza    • Mumps    • Nasal drainage    • BENEDICTO  "(obstructive sleep apnea) 8/22/2017    AHI 74.4, minimum saturation 83%, on APAP 7-15 cm.   • Pneumonia    • Rheumatoid arthritis (CMS-HCC)    • Tonsillitis        Social History   Substance Use Topics   • Smoking status: Never Smoker   • Smokeless tobacco: Never Used   • Alcohol use No       Family History   Problem Relation Age of Onset   • Heart Failure Father    • Cancer Sister    • Heart Disease Son    • No Known Problems Daughter    • No Known Problems Daughter    • No Known Problems Daughter    • Sleep Apnea Neg Hx        Current medications as of today   Current Outpatient Prescriptions   Medication Sig Dispense Refill   • apixaban (ELIQUIS) 5mg Tab Take 5 mg by mouth 2 Times a Day.     • zolpidem (AMBIEN) 5 MG Tab Take 1 Tab by mouth at bedtime as needed for Sleep (Insomnia). 30 Tab 1   • Ibuprofen-Diphenhydramine HCl (ADVIL PM) 200-25 MG Cap Take  by mouth.     • lisinopril (PRINIVIL) 10 MG TABS Take 1 Tab by mouth every day. 30 Tab 11   • tamsulosin (FLOMAX) 0.4 MG capsule Take 1 Cap by mouth every day. 30 Cap 11   • atorvastatin (LIPITOR) 40 MG TABS Take 1 Tab by mouth every day. 30 Tab 11   • aspirin 81 MG tablet Take 81 mg by mouth every day.     • zolpidem (AMBIEN) 5 MG Tab Take 1-2 tablets by mouth every evening as needed for insomnia. Bring to sleep study. (Patient not taking: Reported on 6/22/2017) 3 Tab 0     No current facility-administered medications for this visit.        Allergies: Patient has no known allergies.    Blood pressure 134/68, pulse 62, resp. rate 16, height 1.676 m (5' 5.98\"), weight 90.7 kg (200 lb), SpO2 94 %.      ROS: As per HPI and otherwise negative if not stated.      Physical exam:   Constitutional: Well-nourished, well-developed, in no acute distress  Eyes: PERRL  Mouth/Throat: Oropharynx is moist, clear, no lesions  Neck: supple, trachea midline  Respiratory: no intercostal retractions or accessory muscle use   Lungs auscultation: Clear to auscultation " bilaterally  Cardiovascular: Regular rate rhythm no murmurs, rubs or gallops  Musculoskeletal: no clubbing or cyanosis  Skin: No rashes or lesions noted on exposed skin  Neuro: No focal deficit noted  Psychiatric: Oriented to time, person and place.     Diagnosis:  1. Cardiac pacemaker in situ     2. BENEDICTO (obstructive sleep apnea)  MASK FITTING    DME MASK AND SUPPLIES    DME OTHER   3. Chronic insomnia  REFERRAL TO OTHER   4. Other chronic pain         Plan:  1. Patient can try OTC melatonin for insomnia  2. Sleep hygiene was reviewed in detail, handout provided  3. Decreased CPAP pressure 7-12 cm H2O  4. Order for new mask and supplies to [facility] [place]. Patient is encouraged to follow up with them for routine supplies.  5. Follow With PCP regarding pain   6.  Referral to Dr. Bobby for chronic insomnia  7. Follow-up here in 6 months, sooner if needed

## 2018-10-11 ENCOUNTER — SLEEP CENTER VISIT (OUTPATIENT)
Dept: SLEEP MEDICINE | Facility: MEDICAL CENTER | Age: 73
End: 2018-10-11
Payer: MEDICARE

## 2018-10-11 VITALS
WEIGHT: 193 LBS | HEIGHT: 66 IN | DIASTOLIC BLOOD PRESSURE: 70 MMHG | SYSTOLIC BLOOD PRESSURE: 108 MMHG | TEMPERATURE: 98.2 F | OXYGEN SATURATION: 94 % | RESPIRATION RATE: 16 BRPM | BODY MASS INDEX: 31.02 KG/M2 | HEART RATE: 70 BPM

## 2018-10-11 DIAGNOSIS — G47.33 OSA (OBSTRUCTIVE SLEEP APNEA): ICD-10-CM

## 2018-10-11 DIAGNOSIS — E66.9 OBESITY (BMI 30-39.9): ICD-10-CM

## 2018-10-11 DIAGNOSIS — Z79.01 ANTICOAGULATED: ICD-10-CM

## 2018-10-11 DIAGNOSIS — I48.20 CHRONIC ATRIAL FIBRILLATION (HCC): ICD-10-CM

## 2018-10-11 PROCEDURE — 99214 OFFICE O/P EST MOD 30 MIN: CPT | Performed by: NURSE PRACTITIONER

## 2018-10-11 ASSESSMENT — ENCOUNTER SYMPTOMS
EYE PAIN: 0
BRUISES/BLEEDS EASILY: 0
NEUROLOGICAL NEGATIVE: 1
GASTROINTESTINAL NEGATIVE: 1
CONSTITUTIONAL NEGATIVE: 1
EYE DISCHARGE: 0
MUSCULOSKELETAL NEGATIVE: 1
CARDIOVASCULAR NEGATIVE: 1
PSYCHIATRIC NEGATIVE: 1
RESPIRATORY NEGATIVE: 1

## 2018-10-11 NOTE — PROGRESS NOTES
Chief Complaint   Patient presents with   • Apnea     last seen 4/6/18          HPI: This patient is a 73 y.o. male, who presents for follow-up of BENEDICTO.     He has a history of A. fib/flutter chronically anticoagulated.  He denies chest pains, pressure or palpitations.  Sometimes he will get shortness of breath with exertion.  He has a cardiac pacemaker in place.  He has had an ablation in the past which worked temporarily.  He is scheduled for what sounds like another ablation next week.    In regards to BENEDICTO, PSG indicates severe obstructive sleep apnea with an AHI of 74, minimum oxygen saturation of 83 %. he is compliant with CPAP 7-12 cm H2O.  Pressures were decreased at his last visit for tolerance.  He feels this was effective but requests slightly lower pressures still. Compliance download over the past 30 days indicates 97 % compliance, average use of 6 hours 57 minutes per night, AHI of 5. Patient reports benefiting from therapy.  His mask will still leak occasionally but not troublesome.  He feels pressures are slightly too high still.  Denies EDS or a.m. headache.      Past Medical History:   Diagnosis Date   • Arthritis    • Atrial fibrillation (HCC)    • AV block, complete (HCC)     Secondary to inferior MI, treated with PPM.   • Back pain    • CAD (coronary artery disease) July 2012    MPI with infarct with minimal amount of ischemia in inferior wall, LVEF 61%.   • Chickenpox    • Coronary heart disease    • Depression    • Martiniquais measles    • Heart attack (HCC)    • History of MI (myocardial infarction) 2005    Inferior MI   • Hyperlipidemia    • Hypertension    • Influenza    • Mumps    • Nasal drainage    • BENEDICTO (obstructive sleep apnea) 8/22/2017    AHI 74.4, minimum saturation 83%, on APAP 7-15 cm.   • Pneumonia    • Rheumatoid arthritis (HCC)    • Tonsillitis        Social History   Substance Use Topics   • Smoking status: Never Smoker   • Smokeless tobacco: Never Used   • Alcohol use No  "      Family History   Problem Relation Age of Onset   • Heart Failure Father    • Cancer Sister    • Heart Disease Son    • No Known Problems Daughter    • No Known Problems Daughter    • No Known Problems Daughter    • Sleep Apnea Neg Hx        Immunization History   Administered Date(s) Administered   • Influenza (IM) Preservative Free 11/15/2010, 10/06/2011   • Influenza Seasonal Injectable 12/28/2012, 10/02/2013, 01/15/2015, 10/12/2015   • Influenza TIV (IM) 10/12/2015, 10/10/2016   • Influenza Vaccine Adult HD 10/12/2015   • Influenza Vaccine H1N1 01/07/2010   • Influenza Vaccine Quad Inj (Pf) 10/10/2016, 10/31/2017, 10/02/2018   • Pneumococcal Conjugate Vaccine (Prevnar/PCV-13) 10/10/2016   • Pneumococcal polysaccharide vaccine (PPSV-23) 01/15/2015   • Zoster Vaccine Live (ZVL) (Zostavax) 01/15/2015       Current medications as of today   Current Outpatient Prescriptions   Medication Sig Dispense Refill   • apixaban (ELIQUIS) 5mg Tab Take 5 mg by mouth 2 Times a Day.     • Ibuprofen-Diphenhydramine HCl (ADVIL PM) 200-25 MG Cap Take  by mouth.     • lisinopril (PRINIVIL) 10 MG TABS Take 1 Tab by mouth every day. 30 Tab 11   • tamsulosin (FLOMAX) 0.4 MG capsule Take 1 Cap by mouth every day. 30 Cap 11   • atorvastatin (LIPITOR) 40 MG TABS Take 1 Tab by mouth every day. 30 Tab 11     No current facility-administered medications for this visit.        Allergies: Patient has no known allergies.    Blood pressure 108/70, pulse 70, temperature 36.8 °C (98.2 °F), temperature source Temporal, resp. rate 16, height 1.676 m (5' 6\"), weight 87.5 kg (193 lb), SpO2 94 %.      Review of Systems   Constitutional: Negative.    HENT: Negative.    Eyes: Negative for pain and discharge.   Respiratory: Negative.    Cardiovascular: Negative.    Gastrointestinal: Negative.    Musculoskeletal: Negative.    Skin: Negative.    Neurological: Negative.    Endo/Heme/Allergies: Negative for environmental allergies. Does not bruise/bleed " easily.   Psychiatric/Behavioral: Negative.        Physical Exam   Constitutional: He is oriented to person, place, and time and well-developed, well-nourished, and in no distress.   HENT:   Head: Normocephalic and atraumatic.   Nose: Nose normal.   Eyes: Pupils are equal, round, and reactive to light.   Neck: Normal range of motion. Neck supple. No tracheal deviation present.   Cardiovascular: Normal rate and normal heart sounds.  Exam reveals no friction rub.    No murmur heard.  Pulmonary/Chest: Effort normal and breath sounds normal.   Musculoskeletal: Normal range of motion.   Neurological: He is alert and oriented to person, place, and time. Gait normal.   Skin: Skin is warm and dry.   Psychiatric: Mood, memory, affect and judgment normal.       Diagnoses/Plan:    1. BENEDICTO (obstructive sleep apnea)   Continue CPAP nightly, pressure adjusted today to 6/11 cm H2O, clean mask & tubing weekly, Replace supplies as insurance will allow, RX for new supplies to DME.  Mask fitting performed today.      - DME Mask Fitting; Future  - DME Other    2. Chronic atrial fibrillation (HCC)  Patient scheduled for procedure next week.  He follows with cardiology routinely.  He denies chest pain or pressure.    3. Anticoagulated  Stable, compliant with Eliquis, denies signs or symptoms of bleeding    4. Obesity (BMI 30-39.9)    - Patient identified as having weight management issue.  Appropriate orders and counseling given.    Follow-up in 6 months for compliance download, sooner if needed.  If AHI remains normal follow-up annually    This dictation was created using voice recognition software. The accuracy of the dictation is limited to the abilities of the software. I expect there may be some errors of grammar and possibly content.

## 2018-12-07 ENCOUNTER — TELEPHONE (OUTPATIENT)
Dept: PULMONOLOGY | Facility: HOSPICE | Age: 73
End: 2018-12-07

## 2018-12-07 DIAGNOSIS — G47.33 OSA (OBSTRUCTIVE SLEEP APNEA): ICD-10-CM

## 2018-12-07 NOTE — TELEPHONE ENCOUNTER
Patient called and wanted to know if he could get his pressure reduce. He said that the air is coming out of his eyeballs he also mentioned that his mask may not be fitting right but he said that he wants to see if the reduction in pressure will help and if not then he wall see about the mask

## 2018-12-08 NOTE — TELEPHONE ENCOUNTER
Can adjust to 5-10 no further adjustment without a compliance check. Order placed.   Please also have him go get fitted for  A new mask

## 2020-01-08 DIAGNOSIS — Z01.810 PRE-OPERATIVE CARDIOVASCULAR EXAMINATION: ICD-10-CM

## 2020-01-08 DIAGNOSIS — Z01.812 PRE-OPERATIVE LABORATORY EXAMINATION: ICD-10-CM

## 2020-01-08 LAB
ANION GAP SERPL CALC-SCNC: 7 MMOL/L (ref 0–11.9)
BUN SERPL-MCNC: 14 MG/DL (ref 8–22)
CALCIUM SERPL-MCNC: 9.4 MG/DL (ref 8.5–10.5)
CHLORIDE SERPL-SCNC: 105 MMOL/L (ref 96–112)
CO2 SERPL-SCNC: 26 MMOL/L (ref 20–33)
CREAT SERPL-MCNC: 0.98 MG/DL (ref 0.5–1.4)
EKG IMPRESSION: NORMAL
ERYTHROCYTE [DISTWIDTH] IN BLOOD BY AUTOMATED COUNT: 52.8 FL (ref 35.9–50)
GLUCOSE SERPL-MCNC: 102 MG/DL (ref 65–99)
HCT VFR BLD AUTO: 37 % (ref 42–52)
HGB BLD-MCNC: 11.3 G/DL (ref 14–18)
MCH RBC QN AUTO: 25.7 PG (ref 27–33)
MCHC RBC AUTO-ENTMCNC: 30.5 G/DL (ref 33.7–35.3)
MCV RBC AUTO: 84.3 FL (ref 81.4–97.8)
PLATELET # BLD AUTO: 206 K/UL (ref 164–446)
PMV BLD AUTO: 11.1 FL (ref 9–12.9)
POTASSIUM SERPL-SCNC: 4.7 MMOL/L (ref 3.6–5.5)
RBC # BLD AUTO: 4.39 M/UL (ref 4.7–6.1)
SODIUM SERPL-SCNC: 138 MMOL/L (ref 135–145)
WBC # BLD AUTO: 9.4 K/UL (ref 4.8–10.8)

## 2020-01-08 PROCEDURE — 36415 COLL VENOUS BLD VENIPUNCTURE: CPT

## 2020-01-08 PROCEDURE — 93010 ELECTROCARDIOGRAM REPORT: CPT | Performed by: INTERNAL MEDICINE

## 2020-01-08 PROCEDURE — 93005 ELECTROCARDIOGRAM TRACING: CPT

## 2020-01-08 PROCEDURE — 85027 COMPLETE CBC AUTOMATED: CPT

## 2020-01-08 PROCEDURE — 80048 BASIC METABOLIC PNL TOTAL CA: CPT

## 2020-01-08 RX ORDER — SOTALOL HYDROCHLORIDE 80 MG/1
80 TABLET ORAL 2 TIMES DAILY
COMMUNITY
End: 2023-11-29

## 2020-01-08 NOTE — DISCHARGE PLANNING
DISCHARGE PLANNING NOTE - TOTAL JOINT     Procedure: Procedure(s):  ARTHROPLASTY, ANKLE, TOTAL-RUBALCAVA  Procedure Date: 1/20/2020  Insurance:  Payor: MEDICARE / Plan: MEDICARE PART A & B  Equipment currently available at home? front-wheel walker  Steps into the home? ramp  Steps within the home? 0  Toilet height? ADA  Type of shower? walk-in shower  Who will be with you during your recovery? spouse  Is Outpatient Physical Therapy set up after surgery? No      Plan: Dunlo will be NWB for 2 weeks. He will need a set of crutches. Reviewed Equipment Resource list and provided a copy to the patient. Recommended a raised toilet seat, and shower chair. Provided Home Safety Checklist. Requested information on Physical Therapy available in [employer]. Searched the Internet and provided information to wife, Nuvia. Anticipate discharge home.

## 2020-01-20 ENCOUNTER — APPOINTMENT (OUTPATIENT)
Dept: RADIOLOGY | Facility: MEDICAL CENTER | Age: 75
DRG: 469 | End: 2020-01-20
Attending: ORTHOPAEDIC SURGERY
Payer: MEDICARE

## 2020-01-20 ENCOUNTER — ANESTHESIA EVENT (OUTPATIENT)
Dept: SURGERY | Facility: MEDICAL CENTER | Age: 75
DRG: 469 | End: 2020-01-20
Payer: MEDICARE

## 2020-01-20 ENCOUNTER — ANESTHESIA (OUTPATIENT)
Dept: SURGERY | Facility: MEDICAL CENTER | Age: 75
DRG: 469 | End: 2020-01-20
Payer: MEDICARE

## 2020-01-20 ENCOUNTER — HOSPITAL ENCOUNTER (INPATIENT)
Facility: MEDICAL CENTER | Age: 75
LOS: 1 days | DRG: 469 | End: 2020-01-21
Attending: ORTHOPAEDIC SURGERY | Admitting: ORTHOPAEDIC SURGERY
Payer: MEDICARE

## 2020-01-20 LAB
ANION GAP SERPL CALC-SCNC: 8 MMOL/L (ref 0–11.9)
BUN SERPL-MCNC: 18 MG/DL (ref 8–22)
CALCIUM SERPL-MCNC: 9.1 MG/DL (ref 8.5–10.5)
CHLORIDE SERPL-SCNC: 106 MMOL/L (ref 96–112)
CO2 SERPL-SCNC: 24 MMOL/L (ref 20–33)
CREAT SERPL-MCNC: 0.91 MG/DL (ref 0.5–1.4)
GLUCOSE SERPL-MCNC: 126 MG/DL (ref 65–99)
MAGNESIUM SERPL-MCNC: 2 MG/DL (ref 1.5–2.5)
POTASSIUM SERPL-SCNC: 4.7 MMOL/L (ref 3.6–5.5)
SODIUM SERPL-SCNC: 138 MMOL/L (ref 135–145)

## 2020-01-20 PROCEDURE — 700111 HCHG RX REV CODE 636 W/ 250 OVERRIDE (IP): Performed by: ANESTHESIOLOGY

## 2020-01-20 PROCEDURE — 160036 HCHG PACU - EA ADDL 30 MINS PHASE I: Performed by: ORTHOPAEDIC SURGERY

## 2020-01-20 PROCEDURE — 80048 BASIC METABOLIC PNL TOTAL CA: CPT

## 2020-01-20 PROCEDURE — 36415 COLL VENOUS BLD VENIPUNCTURE: CPT

## 2020-01-20 PROCEDURE — 0SRF0JZ REPLACEMENT OF RIGHT ANKLE JOINT WITH SYNTHETIC SUBSTITUTE, OPEN APPROACH: ICD-10-PCS | Performed by: ORTHOPAEDIC SURGERY

## 2020-01-20 PROCEDURE — 73600 X-RAY EXAM OF ANKLE: CPT | Mod: RT

## 2020-01-20 PROCEDURE — 503339 HCHG DRESSSING PICO: Performed by: ORTHOPAEDIC SURGERY

## 2020-01-20 PROCEDURE — 160029 HCHG SURGERY MINUTES - 1ST 30 MINS LEVEL 4: Performed by: ORTHOPAEDIC SURGERY

## 2020-01-20 PROCEDURE — 502240 HCHG MISC OR SUPPLY RC 0272: Performed by: ORTHOPAEDIC SURGERY

## 2020-01-20 PROCEDURE — 160009 HCHG ANES TIME/MIN: Performed by: ORTHOPAEDIC SURGERY

## 2020-01-20 PROCEDURE — 700105 HCHG RX REV CODE 258: Performed by: ANESTHESIOLOGY

## 2020-01-20 PROCEDURE — 700102 HCHG RX REV CODE 250 W/ 637 OVERRIDE(OP): Performed by: ORTHOPAEDIC SURGERY

## 2020-01-20 PROCEDURE — 700102 HCHG RX REV CODE 250 W/ 637 OVERRIDE(OP): Performed by: ANESTHESIOLOGY

## 2020-01-20 PROCEDURE — 500881 HCHG PACK, EXTREMITY: Performed by: ORTHOPAEDIC SURGERY

## 2020-01-20 PROCEDURE — 700105 HCHG RX REV CODE 258

## 2020-01-20 PROCEDURE — 700105 HCHG RX REV CODE 258: Performed by: ORTHOPAEDIC SURGERY

## 2020-01-20 PROCEDURE — A4306 DRUG DELIVERY SYSTEM <=50 ML: HCPCS | Performed by: ANESTHESIOLOGY

## 2020-01-20 PROCEDURE — 700101 HCHG RX REV CODE 250: Performed by: ORTHOPAEDIC SURGERY

## 2020-01-20 PROCEDURE — 500367 HCHG DRAIN KIT, HEMOVAC: Performed by: ORTHOPAEDIC SURGERY

## 2020-01-20 PROCEDURE — 64445 NJX AA&/STRD SCIATIC NRV IMG: CPT | Performed by: ORTHOPAEDIC SURGERY

## 2020-01-20 PROCEDURE — 160035 HCHG PACU - 1ST 60 MINS PHASE I: Performed by: ORTHOPAEDIC SURGERY

## 2020-01-20 PROCEDURE — A9270 NON-COVERED ITEM OR SERVICE: HCPCS | Performed by: ORTHOPAEDIC SURGERY

## 2020-01-20 PROCEDURE — A9270 NON-COVERED ITEM OR SERVICE: HCPCS | Performed by: ANESTHESIOLOGY

## 2020-01-20 PROCEDURE — 3E0T3BZ INTRODUCTION OF ANESTHETIC AGENT INTO PERIPHERAL NERVES AND PLEXI, PERCUTANEOUS APPROACH: ICD-10-PCS | Performed by: ORTHOPAEDIC SURGERY

## 2020-01-20 PROCEDURE — 502000 HCHG MISC OR IMPLANTS RC 0278: Performed by: ORTHOPAEDIC SURGERY

## 2020-01-20 PROCEDURE — 770001 HCHG ROOM/CARE - MED/SURG/GYN PRIV*

## 2020-01-20 PROCEDURE — 160041 HCHG SURGERY MINUTES - EA ADDL 1 MIN LEVEL 4: Performed by: ORTHOPAEDIC SURGERY

## 2020-01-20 PROCEDURE — A6223 GAUZE >16<=48 NO W/SAL W/O B: HCPCS | Performed by: ORTHOPAEDIC SURGERY

## 2020-01-20 PROCEDURE — 700111 HCHG RX REV CODE 636 W/ 250 OVERRIDE (IP): Performed by: ORTHOPAEDIC SURGERY

## 2020-01-20 PROCEDURE — 700101 HCHG RX REV CODE 250: Performed by: ANESTHESIOLOGY

## 2020-01-20 PROCEDURE — 501838 HCHG SUTURE GENERAL: Performed by: ORTHOPAEDIC SURGERY

## 2020-01-20 PROCEDURE — A6454 SELF-ADHER BAND W>=3" <5"/YD: HCPCS | Performed by: ORTHOPAEDIC SURGERY

## 2020-01-20 PROCEDURE — 160048 HCHG OR STATISTICAL LEVEL 1-5: Performed by: ORTHOPAEDIC SURGERY

## 2020-01-20 PROCEDURE — 83735 ASSAY OF MAGNESIUM: CPT

## 2020-01-20 PROCEDURE — 160002 HCHG RECOVERY MINUTES (STAT): Performed by: ORTHOPAEDIC SURGERY

## 2020-01-20 PROCEDURE — 64447 NJX AA&/STRD FEMORAL NRV IMG: CPT | Performed by: ORTHOPAEDIC SURGERY

## 2020-01-20 DEVICE — IMPLANTABLE DEVICE: Type: IMPLANTABLE DEVICE | Site: ANKLE | Status: FUNCTIONAL

## 2020-01-20 RX ORDER — OXYCODONE HYDROCHLORIDE 5 MG/1
5 TABLET ORAL
Status: DISCONTINUED | OUTPATIENT
Start: 2020-01-20 | End: 2020-01-21 | Stop reason: HOSPADM

## 2020-01-20 RX ORDER — HYDROMORPHONE HYDROCHLORIDE 1 MG/ML
0.4 INJECTION, SOLUTION INTRAMUSCULAR; INTRAVENOUS; SUBCUTANEOUS
Status: DISCONTINUED | OUTPATIENT
Start: 2020-01-20 | End: 2020-01-20 | Stop reason: HOSPADM

## 2020-01-20 RX ORDER — ACETAMINOPHEN 500 MG
500 TABLET ORAL EVERY 6 HOURS PRN
COMMUNITY

## 2020-01-20 RX ORDER — SODIUM CHLORIDE, SODIUM LACTATE, POTASSIUM CHLORIDE, CALCIUM CHLORIDE 600; 310; 30; 20 MG/100ML; MG/100ML; MG/100ML; MG/100ML
INJECTION, SOLUTION INTRAVENOUS
Status: DISCONTINUED | OUTPATIENT
Start: 2020-01-20 | End: 2020-01-20 | Stop reason: HOSPADM

## 2020-01-20 RX ORDER — ATORVASTATIN CALCIUM 40 MG/1
40 TABLET, FILM COATED ORAL DAILY
Status: DISCONTINUED | OUTPATIENT
Start: 2020-01-20 | End: 2020-01-21 | Stop reason: HOSPADM

## 2020-01-20 RX ORDER — HYDROMORPHONE HYDROCHLORIDE 1 MG/ML
0.1 INJECTION, SOLUTION INTRAMUSCULAR; INTRAVENOUS; SUBCUTANEOUS
Status: DISCONTINUED | OUTPATIENT
Start: 2020-01-20 | End: 2020-01-20 | Stop reason: HOSPADM

## 2020-01-20 RX ORDER — OXYCODONE HCL 5 MG/5 ML
10 SOLUTION, ORAL ORAL
Status: COMPLETED | OUTPATIENT
Start: 2020-01-20 | End: 2020-01-20

## 2020-01-20 RX ORDER — MAGNESIUM HYDROXIDE 1200 MG/15ML
LIQUID ORAL
Status: COMPLETED | OUTPATIENT
Start: 2020-01-20 | End: 2020-01-20

## 2020-01-20 RX ORDER — ACETAMINOPHEN 500 MG
1000 TABLET ORAL EVERY 6 HOURS
Status: DISCONTINUED | OUTPATIENT
Start: 2020-01-20 | End: 2020-01-21 | Stop reason: HOSPADM

## 2020-01-20 RX ORDER — LABETALOL HYDROCHLORIDE 5 MG/ML
5 INJECTION, SOLUTION INTRAVENOUS
Status: DISCONTINUED | OUTPATIENT
Start: 2020-01-20 | End: 2020-01-20 | Stop reason: HOSPADM

## 2020-01-20 RX ORDER — HALOPERIDOL 5 MG/ML
1 INJECTION INTRAMUSCULAR
Status: DISCONTINUED | OUTPATIENT
Start: 2020-01-20 | End: 2020-01-20 | Stop reason: HOSPADM

## 2020-01-20 RX ORDER — SOTALOL HYDROCHLORIDE 80 MG/1
80 TABLET ORAL 2 TIMES DAILY
Status: DISCONTINUED | OUTPATIENT
Start: 2020-01-21 | End: 2020-01-21 | Stop reason: HOSPADM

## 2020-01-20 RX ORDER — ONDANSETRON 2 MG/ML
INJECTION INTRAMUSCULAR; INTRAVENOUS PRN
Status: DISCONTINUED | OUTPATIENT
Start: 2020-01-20 | End: 2020-01-20 | Stop reason: SURG

## 2020-01-20 RX ORDER — ONDANSETRON 2 MG/ML
4 INJECTION INTRAMUSCULAR; INTRAVENOUS
Status: DISCONTINUED | OUTPATIENT
Start: 2020-01-20 | End: 2020-01-20 | Stop reason: HOSPADM

## 2020-01-20 RX ORDER — CEFAZOLIN SODIUM 2 G/100ML
2 INJECTION, SOLUTION INTRAVENOUS EVERY 8 HOURS
Status: DISCONTINUED | OUTPATIENT
Start: 2020-01-20 | End: 2020-01-20

## 2020-01-20 RX ORDER — CEFAZOLIN SODIUM 1 G/3ML
INJECTION, POWDER, FOR SOLUTION INTRAMUSCULAR; INTRAVENOUS PRN
Status: DISCONTINUED | OUTPATIENT
Start: 2020-01-20 | End: 2020-01-20 | Stop reason: SURG

## 2020-01-20 RX ORDER — SODIUM CHLORIDE, SODIUM LACTATE, POTASSIUM CHLORIDE, CALCIUM CHLORIDE 600; 310; 30; 20 MG/100ML; MG/100ML; MG/100ML; MG/100ML
INJECTION, SOLUTION INTRAVENOUS
Status: ACTIVE
Start: 2020-01-20 | End: 2020-01-21

## 2020-01-20 RX ORDER — GABAPENTIN 300 MG/1
300 CAPSULE ORAL ONCE
Status: COMPLETED | OUTPATIENT
Start: 2020-01-20 | End: 2020-01-20

## 2020-01-20 RX ORDER — DEXAMETHASONE SODIUM PHOSPHATE 4 MG/ML
INJECTION, SOLUTION INTRA-ARTICULAR; INTRALESIONAL; INTRAMUSCULAR; INTRAVENOUS; SOFT TISSUE
Status: COMPLETED | OUTPATIENT
Start: 2020-01-20 | End: 2020-01-20

## 2020-01-20 RX ORDER — HYDRALAZINE HYDROCHLORIDE 20 MG/ML
5 INJECTION INTRAMUSCULAR; INTRAVENOUS
Status: DISCONTINUED | OUTPATIENT
Start: 2020-01-20 | End: 2020-01-20 | Stop reason: HOSPADM

## 2020-01-20 RX ORDER — SODIUM CHLORIDE, SODIUM LACTATE, POTASSIUM CHLORIDE, CALCIUM CHLORIDE 600; 310; 30; 20 MG/100ML; MG/100ML; MG/100ML; MG/100ML
INJECTION, SOLUTION INTRAVENOUS CONTINUOUS
Status: DISCONTINUED | OUTPATIENT
Start: 2020-01-20 | End: 2020-01-20 | Stop reason: HOSPADM

## 2020-01-20 RX ORDER — DIPHENHYDRAMINE HYDROCHLORIDE 50 MG/ML
12.5 INJECTION INTRAMUSCULAR; INTRAVENOUS
Status: DISCONTINUED | OUTPATIENT
Start: 2020-01-20 | End: 2020-01-20 | Stop reason: HOSPADM

## 2020-01-20 RX ORDER — CEFAZOLIN SODIUM 2 G/100ML
2 INJECTION, SOLUTION INTRAVENOUS EVERY 8 HOURS
Status: COMPLETED | OUTPATIENT
Start: 2020-01-20 | End: 2020-01-21

## 2020-01-20 RX ORDER — MEPERIDINE HYDROCHLORIDE 25 MG/ML
6.25 INJECTION INTRAMUSCULAR; INTRAVENOUS; SUBCUTANEOUS
Status: DISCONTINUED | OUTPATIENT
Start: 2020-01-20 | End: 2020-01-20 | Stop reason: HOSPADM

## 2020-01-20 RX ORDER — ONDANSETRON 2 MG/ML
4 INJECTION INTRAMUSCULAR; INTRAVENOUS EVERY 4 HOURS PRN
Status: DISCONTINUED | OUTPATIENT
Start: 2020-01-20 | End: 2020-01-21 | Stop reason: HOSPADM

## 2020-01-20 RX ORDER — OXYCODONE HYDROCHLORIDE 10 MG/1
10 TABLET ORAL
Status: DISCONTINUED | OUTPATIENT
Start: 2020-01-20 | End: 2020-01-21 | Stop reason: HOSPADM

## 2020-01-20 RX ORDER — SODIUM CHLORIDE, SODIUM LACTATE, POTASSIUM CHLORIDE, CALCIUM CHLORIDE 600; 310; 30; 20 MG/100ML; MG/100ML; MG/100ML; MG/100ML
1000 INJECTION, SOLUTION INTRAVENOUS
Status: COMPLETED | OUTPATIENT
Start: 2020-01-20 | End: 2020-01-20

## 2020-01-20 RX ORDER — MIDAZOLAM HYDROCHLORIDE 1 MG/ML
INJECTION INTRAMUSCULAR; INTRAVENOUS PRN
Status: DISCONTINUED | OUTPATIENT
Start: 2020-01-20 | End: 2020-01-20 | Stop reason: SURG

## 2020-01-20 RX ORDER — ACETAMINOPHEN 500 MG
1000 TABLET ORAL ONCE
Status: COMPLETED | OUTPATIENT
Start: 2020-01-20 | End: 2020-01-20

## 2020-01-20 RX ORDER — CELECOXIB 200 MG/1
200 CAPSULE ORAL ONCE
Status: COMPLETED | OUTPATIENT
Start: 2020-01-20 | End: 2020-01-20

## 2020-01-20 RX ORDER — HYDROMORPHONE HYDROCHLORIDE 1 MG/ML
0.5 INJECTION, SOLUTION INTRAMUSCULAR; INTRAVENOUS; SUBCUTANEOUS
Status: DISCONTINUED | OUTPATIENT
Start: 2020-01-20 | End: 2020-01-21 | Stop reason: HOSPADM

## 2020-01-20 RX ORDER — ROPIVACAINE HYDROCHLORIDE 5 MG/ML
INJECTION, SOLUTION EPIDURAL; INFILTRATION; PERINEURAL
Status: COMPLETED | OUTPATIENT
Start: 2020-01-20 | End: 2020-01-20

## 2020-01-20 RX ORDER — LIDOCAINE HYDROCHLORIDE 20 MG/ML
INJECTION, SOLUTION EPIDURAL; INFILTRATION; INTRACAUDAL; PERINEURAL PRN
Status: DISCONTINUED | OUTPATIENT
Start: 2020-01-20 | End: 2020-01-20 | Stop reason: SURG

## 2020-01-20 RX ORDER — HYDROMORPHONE HYDROCHLORIDE 1 MG/ML
0.2 INJECTION, SOLUTION INTRAMUSCULAR; INTRAVENOUS; SUBCUTANEOUS
Status: DISCONTINUED | OUTPATIENT
Start: 2020-01-20 | End: 2020-01-20 | Stop reason: HOSPADM

## 2020-01-20 RX ORDER — SODIUM CHLORIDE 9 MG/ML
INJECTION, SOLUTION INTRAVENOUS
Status: COMPLETED
Start: 2020-01-20 | End: 2020-01-20

## 2020-01-20 RX ORDER — OXYCODONE HCL 5 MG/5 ML
5 SOLUTION, ORAL ORAL
Status: COMPLETED | OUTPATIENT
Start: 2020-01-20 | End: 2020-01-20

## 2020-01-20 RX ADMIN — FENTANYL CITRATE 25 MCG: 50 INJECTION, SOLUTION INTRAMUSCULAR; INTRAVENOUS at 14:51

## 2020-01-20 RX ADMIN — DEXAMETHASONE SODIUM PHOSPHATE 4 MG: 4 INJECTION, SOLUTION INTRA-ARTICULAR; INTRALESIONAL; INTRAMUSCULAR; INTRAVENOUS; SOFT TISSUE at 14:55

## 2020-01-20 RX ADMIN — ROPIVACAINE HYDROCHLORIDE: 2 INJECTION, SOLUTION EPIDURAL; INFILTRATION at 17:15

## 2020-01-20 RX ADMIN — CELECOXIB 200 MG: 200 CAPSULE ORAL at 12:45

## 2020-01-20 RX ADMIN — GABAPENTIN 300 MG: 300 CAPSULE ORAL at 12:46

## 2020-01-20 RX ADMIN — SODIUM CHLORIDE, POTASSIUM CHLORIDE, SODIUM LACTATE AND CALCIUM CHLORIDE: 600; 310; 30; 20 INJECTION, SOLUTION INTRAVENOUS at 14:26

## 2020-01-20 RX ADMIN — PROPOFOL 40 MG: 10 INJECTION, EMULSION INTRAVENOUS at 17:03

## 2020-01-20 RX ADMIN — OXYCODONE HYDROCHLORIDE 10 MG: 5 SOLUTION ORAL at 17:34

## 2020-01-20 RX ADMIN — CEFAZOLIN SODIUM 2 G: 2 INJECTION, SOLUTION INTRAVENOUS at 23:13

## 2020-01-20 RX ADMIN — FENTANYL CITRATE 25 MCG: 0.05 INJECTION, SOLUTION INTRAMUSCULAR; INTRAVENOUS at 17:41

## 2020-01-20 RX ADMIN — MIDAZOLAM HYDROCHLORIDE 2 MG: 1 INJECTION, SOLUTION INTRAMUSCULAR; INTRAVENOUS at 14:26

## 2020-01-20 RX ADMIN — SODIUM CHLORIDE, POTASSIUM CHLORIDE, SODIUM LACTATE AND CALCIUM CHLORIDE 1000 ML: 600; 310; 30; 20 INJECTION, SOLUTION INTRAVENOUS at 12:46

## 2020-01-20 RX ADMIN — ACETAMINOPHEN 1000 MG: 500 TABLET ORAL at 23:12

## 2020-01-20 RX ADMIN — FENTANYL CITRATE 50 MCG: 50 INJECTION, SOLUTION INTRAMUSCULAR; INTRAVENOUS at 17:49

## 2020-01-20 RX ADMIN — FENTANYL CITRATE 25 MCG: 50 INJECTION, SOLUTION INTRAMUSCULAR; INTRAVENOUS at 15:49

## 2020-01-20 RX ADMIN — CEFAZOLIN 2 G: 330 INJECTION, POWDER, FOR SOLUTION INTRAMUSCULAR; INTRAVENOUS at 14:53

## 2020-01-20 RX ADMIN — OXYCODONE HYDROCHLORIDE 5 MG: 5 TABLET ORAL at 23:12

## 2020-01-20 RX ADMIN — ONDANSETRON 4 MG: 2 INJECTION INTRAMUSCULAR; INTRAVENOUS at 16:50

## 2020-01-20 RX ADMIN — FENTANYL CITRATE 25 MCG: 0.05 INJECTION, SOLUTION INTRAMUSCULAR; INTRAVENOUS at 17:39

## 2020-01-20 RX ADMIN — DEXAMETHASONE SODIUM PHOSPHATE 2 MG: 4 INJECTION, SOLUTION INTRA-ARTICULAR; INTRALESIONAL; INTRAMUSCULAR; INTRAVENOUS; SOFT TISSUE at 14:30

## 2020-01-20 RX ADMIN — ACETAMINOPHEN 1000 MG: 500 TABLET, FILM COATED ORAL at 12:45

## 2020-01-20 RX ADMIN — ROPIVACAINE HYDROCHLORIDE 25 ML: 5 INJECTION, SOLUTION EPIDURAL; INFILTRATION; PERINEURAL at 14:30

## 2020-01-20 RX ADMIN — PROPOFOL 120 MG: 10 INJECTION, EMULSION INTRAVENOUS at 14:51

## 2020-01-20 RX ADMIN — SODIUM CHLORIDE: 9 INJECTION, SOLUTION INTRAVENOUS at 23:15

## 2020-01-20 RX ADMIN — LIDOCAINE HYDROCHLORIDE 30 MG: 20 INJECTION, SOLUTION EPIDURAL; INFILTRATION; INTRACAUDAL at 14:51

## 2020-01-20 ASSESSMENT — COGNITIVE AND FUNCTIONAL STATUS - GENERAL
CLIMB 3 TO 5 STEPS WITH RAILING: A LOT
MOVING FROM LYING ON BACK TO SITTING ON SIDE OF FLAT BED: A LITTLE
STANDING UP FROM CHAIR USING ARMS: A LOT
SUGGESTED CMS G CODE MODIFIER DAILY ACTIVITY: CJ
TOILETING: A LITTLE
DAILY ACTIVITIY SCORE: 22
HELP NEEDED FOR BATHING: A LITTLE
WALKING IN HOSPITAL ROOM: A LOT
MOVING TO AND FROM BED TO CHAIR: A LITTLE
SUGGESTED CMS G CODE MODIFIER MOBILITY: CK
MOBILITY SCORE: 16

## 2020-01-20 ASSESSMENT — LIFESTYLE VARIABLES
EVER_SMOKED: NEVER
EVER FELT BAD OR GUILTY ABOUT YOUR DRINKING: NO
HOW MANY TIMES IN THE PAST YEAR HAVE YOU HAD 5 OR MORE DRINKS IN A DAY: 0
TOTAL SCORE: 0
HAVE YOU EVER FELT YOU SHOULD CUT DOWN ON YOUR DRINKING: NO
EVER HAD A DRINK FIRST THING IN THE MORNING TO STEADY YOUR NERVES TO GET RID OF A HANGOVER: NO
TOTAL SCORE: 0
ALCOHOL_USE: NO
ON A TYPICAL DAY WHEN YOU DRINK ALCOHOL HOW MANY DRINKS DO YOU HAVE: 0
TOTAL SCORE: 0
AVERAGE NUMBER OF DAYS PER WEEK YOU HAVE A DRINK CONTAINING ALCOHOL: 0
DOES PATIENT WANT TO STOP DRINKING: NO
CONSUMPTION TOTAL: NEGATIVE
HAVE PEOPLE ANNOYED YOU BY CRITICIZING YOUR DRINKING: NO

## 2020-01-20 ASSESSMENT — CHA2DS2 SCORE
SEX: MALE
HYPERTENSION: YES
CHA2DS2 VASC SCORE: 3
CHF OR LEFT VENTRICULAR DYSFUNCTION: NO
VASCULAR DISEASE: YES
DIABETES: NO
PRIOR STROKE OR TIA OR THROMBOEMBOLISM: NO
AGE 65 TO 74: YES
AGE 75 OR GREATER: NO

## 2020-01-20 ASSESSMENT — PATIENT HEALTH QUESTIONNAIRE - PHQ9
2. FEELING DOWN, DEPRESSED, IRRITABLE, OR HOPELESS: NOT AT ALL
SUM OF ALL RESPONSES TO PHQ9 QUESTIONS 1 AND 2: 0
1. LITTLE INTEREST OR PLEASURE IN DOING THINGS: NOT AT ALL

## 2020-01-20 ASSESSMENT — PAIN SCALES - GENERAL: PAIN_LEVEL: 5

## 2020-01-20 NOTE — ANESTHESIA PROCEDURE NOTES
Peripheral Block  Date/Time: 1/20/2020 2:30 PM  Performed by: Gaetano Galvan M.D.  Authorized by: Gaetano Galvan M.D.     Patient Location:  OR  Start Time:  1/20/2020 2:30 PM  End Time:  1/20/2020 2:45 PM  Reason for Block: at surgeon's request and post-op pain management    patient identified, IV checked, site marked, risks and benefits discussed, surgical consent, monitors and equipment checked, pre-op evaluation and timeout performed    Patient Position:  Supine  Prep: ChloraPrep    Monitoring:  Heart rate, continuous pulse ox and cardiac monitor  Block Region:  Lower Extremity  Lower Extremity - Block Type:  Selective FEMORAL nerve block at the Adductor Canal and SCIATIC nerve block, lateral approach    Laterality:  Right  Procedures: ultrasound guided  Image captured, interpreted and electronically stored.  Local Infiltration:  Lidocaine  Strength:  1 %  Dose:  6 ml  Block Type:  Catheter  Needle Length:  100mm  Needle Gauge:  18 G  Needle Localization:  Ultrasound guidance and nerve stimulator  Injection Assessment:  Negative aspiration for heme, no paresthesia on injection, incremental injection and local visualized surrounding nerve on ultrasound  Evidence of intravascular injection: No    Catheter Securement:  E-cath device and tegaderm   US Guided Sciatic Nerve Block   US probe placed several cm proximal to popliteal crease on posterior thigh and scanned caudad and cephalad until Sciatic Nerve (SN) identified superficial/lateral to popliteal artery.  Needle inserted lateral to probe in an in plane approach under direct visualization to a perineural position.  After negative aspiration LA injected with ease and visualized surrounding the SN.Catheter identified by u/s and secured with sterile dressing applied.    US Guided Selective Femoral Nerve Block at Adductor Canal:   US probe placed at mid-thigh level on externally rotated leg and femur identified.  Probe directed medially until Sartorius Muscle  (SM), Femoral Artery (FA) and Saphenous Nerve (SN) identified in Adductor Canal (AC).  Needle inserted anterolateral to probe in an in plane approach into a subsartorial perivascular perineural position.  After negative aspiration LA injected with ease and visualized spreading within the AC.    Lot [id number] Exp 07/23

## 2020-01-20 NOTE — PROGRESS NOTES
Med rec updated and complete  Allergies reviewed  Interviewed pt with family at bedside with permission from pt.  Pt had a list of medication, went over list of medications at bedside and returned list of medications back to pts wife.  Pt reports no vitamins.  Pt reports no antibiotics in the last 2 weeks

## 2020-01-20 NOTE — ANESTHESIA PREPROCEDURE EVALUATION
Relevant Problems   ANESTHESIA   (+) BENEDICTO (obstructive sleep apnea)      CARDIAC   (+) AV block, complete (HCC)   (+) CAD (coronary artery disease)   (+) Cardiac pacemaker in situ   (+) Chronic atrial fibrillation   (+) Essential hypertension, benign      Other   (+) Arthritis       Physical Exam    Airway   Mallampati: II  TM distance: >3 FB  Neck ROM: full       Cardiovascular - normal exam  Rhythm: regular  Rate: normal  (-) murmur     Dental - normal exam  (+) upper dentures           Pulmonary - normal exam  Breath sounds clear to auscultation     Abdominal    Neurological - normal exam                 Anesthesia Plan    ASA 3   ASA physical status 3 criteria: hypertension - poorly controlled, CAD/stents (> 3 months), implanted pacemaker and MI or angina - history (> 3 months)    Plan - general and peripheral nerve block     Peripheral nerve block will be post-op pain control  Airway plan will be LMA        Induction: intravenous    Postoperative Plan: Postoperative administration of opioids is intended.    Pertinent diagnostic labs and testing reviewed    Informed Consent:    Anesthetic plan and risks discussed with patient.    Use of blood products discussed with: patient whom consented to blood products.

## 2020-01-20 NOTE — ANESTHESIA PROCEDURE NOTES
Airway  Date/Time: 1/20/2020 2:52 PM  Performed by: Gaetano Galvan M.D.  Authorized by: Gaetano Galvan M.D.     Location:  OR  Urgency:  Elective  Difficult Airway: No    Indications for Airway Management:  Anesthesia  Spontaneous Ventilation: absent    Sedation Level:  Deep  Preoxygenated: Yes    Mask Difficulty Assessment:  0 - not attempted  Final Airway Type:  Supraglottic airway  Final Supraglottic Airway:  Standard LMA  SGA Size:  5  Number of Attempts at Approach:  1

## 2020-01-21 VITALS
WEIGHT: 195.33 LBS | HEART RATE: 60 BPM | HEIGHT: 66 IN | DIASTOLIC BLOOD PRESSURE: 74 MMHG | RESPIRATION RATE: 18 BRPM | SYSTOLIC BLOOD PRESSURE: 136 MMHG | BODY MASS INDEX: 31.39 KG/M2 | TEMPERATURE: 96.8 F | OXYGEN SATURATION: 93 %

## 2020-01-21 PROCEDURE — 700111 HCHG RX REV CODE 636 W/ 250 OVERRIDE (IP): Performed by: ORTHOPAEDIC SURGERY

## 2020-01-21 PROCEDURE — 700102 HCHG RX REV CODE 250 W/ 637 OVERRIDE(OP): Mod: JG | Performed by: ORTHOPAEDIC SURGERY

## 2020-01-21 PROCEDURE — A9270 NON-COVERED ITEM OR SERVICE: HCPCS | Mod: JG | Performed by: ORTHOPAEDIC SURGERY

## 2020-01-21 PROCEDURE — 97162 PT EVAL MOD COMPLEX 30 MIN: CPT

## 2020-01-21 RX ADMIN — ATORVASTATIN CALCIUM 40 MG: 40 TABLET, FILM COATED ORAL at 04:57

## 2020-01-21 RX ADMIN — SOTALOL HYDROCHLORIDE 80 MG: 80 TABLET ORAL at 08:44

## 2020-01-21 RX ADMIN — ACETAMINOPHEN 1000 MG: 500 TABLET ORAL at 11:43

## 2020-01-21 RX ADMIN — CEFAZOLIN SODIUM 2 G: 2 INJECTION, SOLUTION INTRAVENOUS at 05:00

## 2020-01-21 RX ADMIN — ACETAMINOPHEN 1000 MG: 500 TABLET ORAL at 04:47

## 2020-01-21 ASSESSMENT — GAIT ASSESSMENTS
GAIT LEVEL OF ASSIST: MINIMAL ASSIST
ASSISTIVE DEVICE: FRONT WHEEL WALKER
DISTANCE (FEET): 20

## 2020-01-21 ASSESSMENT — COGNITIVE AND FUNCTIONAL STATUS - GENERAL
MOBILITY SCORE: 19
SUGGESTED CMS G CODE MODIFIER MOBILITY: CK
MOVING FROM LYING ON BACK TO SITTING ON SIDE OF FLAT BED: A LITTLE
CLIMB 3 TO 5 STEPS WITH RAILING: A LOT
STANDING UP FROM CHAIR USING ARMS: A LITTLE
WALKING IN HOSPITAL ROOM: A LITTLE

## 2020-01-21 NOTE — PROGRESS NOTES
2 RN Skin Check    2 RN skin check complete with Allision RN.   Devices in place: Splint, O2 tubing  Skin assessed under devices: yes.  Confirmed pressure ulcers found on none.  New potential pressure ulcers noted on N/A..  The following interventions in place -LLE elevated, pillows for repositioning, waffle overlay in place.

## 2020-01-21 NOTE — THERAPY
"Physical Therapy Evaluation completed.   Bed Mobility:  Supine to Sit: Supervised  Transfers: Sit to Stand: Supervised  Gait: Level Of Assist: Minimal Assist (for safety) with Front-Wheel Walker       Plan of Care: Will benefit from Physical Therapy 1-2 more tx  Discharge Recommendations: Equipment: patient has FWW and crutches at home. Post-acute therapy: Recommend outpatient physical therapy services to address higher level deficits.    See \"Rehab Therapy-Acute\" Patient Summary Report for complete documentation.    Patient is a pleasant 73 YO male s/p R TAA with Dr. Torres on 1/20. He ambulated approximately 20ft x3 with FWW, hop to gait, and min A for safety. He was able to perform bed mobility and transfers at supervision level by end of session. Provided patient education regarding NWB RLE and use of AD, patient with good return demo and verbalized understanding. Recommend outpatient PT following medical clearance. He is at safe level to return home; will follow while in house to reinforce education.    "

## 2020-01-21 NOTE — OR SURGEON
Immediate Post OP Note    PreOp Diagnosis: right ankle arthritis    PostOp Diagnosis: same    Procedure(s):  ARTHROPLASTY, ANKLE, TOTAL, PLACEMENT OF MEDIAL MALLOUS SCREWS, GASTROC SOLEOUS RECESSION - Wound Class: Clean with Drain    Surgeon(s):  AJITH Macias M.D.    Anesthesiologist/Type of Anesthesia:  Anesthesiologist: Gaetano Galvan M.D.; Kg Vidales M.D./General    Surgical Staff:  Circulator: Lisa Mckeon R.N.  Relief Circulator: Kash Duran R.N.  Relief Scrub: Brooks Bowen  Scrub Person: Amberly Skinner Assist: Luli Donnelly  Radiology Technologist: Laci Mathur; Carline Dior    Specimens removed if any:  * No specimens in log *    Estimated Blood Loss: 5 cc    Findings:     Complications:     Post-op plan:  NWB in splint, crutches/walker PRN  Keep splint on until f/u  Drain out POD#1  Pain catheter out POD#3  F/u in 2 weeks  D/C likely in AM        1/20/2020 5:04 PM Shira Dewitt M.D.

## 2020-01-21 NOTE — PROGRESS NOTES
[facility] - Foot and Ankle Service    POD#1    ELI.  Patient denies chest pain, SOB, and dizziness.  Patient is tolerating po intake.    Vitals:    01/20/20 2150 01/20/20 2220 01/21/20 0100 01/21/20 0500   BP: 145/80 127/79 110/61 122/70   Pulse: 60 67 64 74   Resp: 18 18 18 18   Temp: 36.2 °C (97.2 °F) 36.2 °C (97.2 °F) 36.1 °C (96.9 °F) 36.1 °C (96.9 °F)   TempSrc: Temporal Temporal Temporal Temporal   SpO2: 98% 99% 99% 98%   Weight:       Height:           Lab Results   Component Value Date/Time    WBC 9.4 01/08/2020 02:23 PM    RBC 4.39 (L) 01/08/2020 02:23 PM    HEMOGLOBIN 11.3 (L) 01/08/2020 02:23 PM    HEMATOCRIT 37.0 (L) 01/08/2020 02:23 PM    MCV 84.3 01/08/2020 02:23 PM    MCH 25.7 (L) 01/08/2020 02:23 PM    MCHC 30.5 (L) 01/08/2020 02:23 PM    MPV 11.1 01/08/2020 02:23 PM    NEUTSPOLYS 70.5 03/11/2006 04:15 AM    LYMPHOCYTES 21.7 (L) 03/11/2006 04:15 AM    MONOCYTES 6.9 03/11/2006 04:15 AM    EOSINOPHILS 0.5 03/11/2006 04:15 AM    BASOPHILS 0.4 03/11/2006 04:15 AM        Lab Results   Component Value Date/Time    SODIUM 138 01/20/2020 07:50 PM    POTASSIUM 4.7 01/20/2020 07:50 PM    CHLORIDE 106 01/20/2020 07:50 PM    CO2 24 01/20/2020 07:50 PM    GLUCOSE 126 (H) 01/20/2020 07:50 PM    BUN 18 01/20/2020 07:50 PM    CREATININE 0.91 01/20/2020 07:50 PM    CREATININE 1.0 03/10/2006 09:33 AM        Lab Results   Component Value Date/Time    PROTHROMBTM 12.1 03/10/2006 09:33 AM    INR 0.96 03/10/2006 09:33 AM        Results     ** No results found for the last 168 hours. **          Drains: serosanguinous drainge    Physical Exam:    RLE: in splint. Sensation intact to tips of toes.  Toes WWP.      A&P: 75 yo M s/p R TAA  1) NWB RLE  2) Drain out prior to D/C  3) Pain catheter out on POD #3  4) Regular diet  5) F/u in 2 weeks  6) D/C when comfortable      Shira Dewitt Md, PhD  [facility]  Foot and Ankle Fellow  [phone]

## 2020-01-21 NOTE — OR NURSING
Pt's wife/family at bedside in Recovery.  Family updated on status in Recovery and transfer to room T317-00.

## 2020-01-21 NOTE — OR NURSING
"Pt on 2 L NC at this time.  No c/o nausea, tolerating PO fluids and medication.  Pt experiencing throat irritation post op with strong productive cough, thick clear sputum.  Right Sciatic Nerve on-Q pump in place, site CDI.  Pt also received Adductor Nerve Block.  RLE \"heavy\", pt feels \"pressure.\"  Pt able to wiggle right toes and lift leg.  Right toes pink, warm, and less than 3 second capillary refill.  C/o \"burning sensation\" on right inner calf.  Pt has AV pacemaker.  VSS, afebrile, REICH, A/O x4.  Right eye flushed with NS flush due to irritation from rubbing his eye upon waking in Recovery.    RLE pain tolerable per pt, down from 9/10 to 5/10.  RLE elevated. Shahana functioning - green light.  Hemovac compressed and draining small amount of serosanguineous fluid.      One clear pt belonging bag, one floral duffel and one black metal cane on gurney.    "

## 2020-01-21 NOTE — OP REPORT
PREOPERATIVE DIAGNOSIS:  Right ankle arthritis.     POSTOPERATIVE DIAGNOSIS:  Right ankle arthritis.     PROCEDURE PERFORMED:  1.  Right total ankle arthroplasty.  2. Right bone grafting ankle  2.  Right placement of JELANI dressing.  3. Right placement of medial malleolar screws for prophylactic treatment for    potential stress fracture.  4. Right gastroc soleus recession.     SURGEON:  Kareem Torres MD     FIRST ASSISTANT:  Shira Dewitt MD     SECOND ASSISTANT:  Luli Donnelly CFA     ANESTHESIA:  General endotracheal with popliteal block catheter per my request   for pain management.     ESTIMATED BLOOD LOSS:  None.     COMPLICATIONS:  None.     DRAINS:  Hemovac x1.     IMPLANTS:  Bill STAR total ankle arthroplasty, tibia LG, polyethylene 10,   talus SM.     POSTOPERATIVE PLAN:  1.  Perioperative antibiotics.  2.  Follow up in 2 weeks.  3.  Preoperative antibiotics.     INDICATIONS:  The patient is a pleasant 74 year old male.  The patient had problems    with their Right ankle for some time.  The above diagnoses made and options were   discussed including operative and nonoperative.  The patient elected to undergo    operative intervention.  The procedure discussed and all questions were    answered.  Risks of surgery explained to include but not limited to wound    problems, infection, nerve injury, vascular injury, need for further surgery.     The patient understands the potential for persistent risk for pain, malunion, nonunion,    need for further surgery.  The patient understands and accepts these risks and agrees    to proceed.  The site was marked by myself prior to receiving psychotropic    medicines.     PROCEDURE IN DETAIL:  The patient was given a popliteal block and catheter per   my request for pain management.  They were then brought to the operating room and    underwent general endotracheal anesthesia without complications. The Right  lower extremity was prepped and draped in  standard fashion in supine position    with all appropriate padding.  Positive site verification confirmed the patient's Right   lower extremity as well as procedure and confirmation that the patient received    preoperative antibiotics.  Esmarch was used to exsanguinate the foot and ankle   and leg tourniquet was inflated to 250 mmHg.  An anterior incision was made    centered over the anterior ankle.  It was dissected down.  The superficial    peroneal nerve was identified and protected distally.  The interval between    the tibialis anterior and extensor hallus longus was developed.  Neurovascular   bundle was identified and was retracted laterally.  This exposed the ankle    joint.  A guide pin was placed perpendicular to the long axis of the tibia.  Tibial   alignment jig was then placed over the top of this, was pinned in place    parallel to the long axis of the tibia.  This was confirmed on C-arm imaging    on AP view.  On lateral view, the cristian wing was placed and this was    identified to be perpendicular to the long axis of the tibia.  Tibial cut    height was adjusted approximately 5 mm at the subchondral bone margin and    medial and lateral cut was based off the shoulder of the tibia at the medial    malleolar aspect.  This was then pinned in place and the distal tibial    alignment jig was pinned in place.  Malleolar guide pins were placed for later   protection.  Distal tibia cut was made with a combination of oscillating saw    and reciprocating saw.  The tibial cutting jig was removed and all bone was    removed from the distal tibia.  Next, the superior talar cutting guide was    placed onto the tibial alignment jig.  This was then held in neutral position    with slight valgus.  It was pinned in place with talus directly against the    paddle.  The superior cut was made with the oscillating saw.  This was    removed.  The talus was measured to be a SM.  The guide pin for the     talus was placed  through the datum alignment jig was confirmed on    C-arm imaging on AP view to be centered on the talus on lateral view with the    nipple over the lateral process of the talus.  This was then removed and the    anterior, posterior cutting guide was placed over the datum alignment jig pin    and was pinned in place.  The posterior cut was made with the oscillating saw    and the anterior cut was made with the milling drill bit.  An anterior,    posterior cutting guide was then removed and/or guide pin the medial and    lateral cutting guide was then pinned in place into the tibia went to the    talus.  The medial and lateral cuts were made with the reciprocating saw and    bone of the medial and lateral gutters were removed as well as posterior    aspect of the tibia.  Window trial was then placed after the cutting guide was   removed.  It sat very nicely on the bone.  The keel was then drilled.  Window   trial was removed and was punched.  The window trial was also confirmed on    C-arm imaging to be directly against bone as well as direct visualization.     The tibia was then measured and turned to be a LG.  The gutters were   cleared of all soft tissue.  The talar component was then impacted in place    with the long side lateral.  The barrel guide was then set of the tibia on AP    and lateral views with the blue bar protecting the underlying talar component    was determined to be directly against bone on the lateral.  It was pinned in    place and both the medial and lateral barrels were drilled.  This was removed.    All bone was removed.  A thorough irrigation was performed.  Tibial    component was impacted in place with the blue bar protecting the underlying    talar component.  C-arm imaging confirmed tibia component to remain directly    against the distal tibial bone.  A series of polyethylene trials were placed    and a 10 mm demonstrated good range of motion with very minimal lift off medial   and  lateral.  Next, he did have a slight tightness in dorsiflexion with the    knee extended and this improved the knee bent suggesting gastroc contracture.  A    posteromedial incision was made over the musculotendinous junction of the    gastroc.  It was brought down to and through the crural fascia.  This exposed    the underlying gastroc tendon.  Using a rake to pull the gastroc under direct    visualization, gastroc was released from lateral to medial under direct    visualization to avoid injury to the sural nerve.  Once this was completed,    there was much improved dorsiflexion with the knee extended.  The wound was    irrigated with copious irrigation and was closed in layered fashion with 3-0    Vicryl and 3-0 nylon.  Next, using C-arm imaging, the trial was exchanged   for a 10 mm implant.   The patient's medial malleolar wall was thinned and there was  concern for impending malleolar stress fracture.  ONe guide pins from the lisandra    4.0 cannulated screw was placed from the medial malleolus into the distal    tibia avoiding the tibial implant was confirmed on C-arm imaging.  This were    filled with 40 mm partially threaded screws.  The barrels were then bone    grafted.  Final C-arm imaging demonstrates satisfactory position for internal    fixation alignment.  The patient had excellent range of motion.  No gross instability.    Wounds were irrigated with copious irrigation.  Hemovac drain was placed.     The anterior wound was closed with multiple layers closing the capsular layer    followed by an extensor retinacular layer followed by subcutaneous layer, all    with 3-0 Vicryl and 3-0 nylon for the skin.  Sterile dressings were applied    including a negative pressure incisional dressing, which demonstrated a    positive seal.  The patient was placed into a bulky Taylor splint and woke from    general anesthesia without complications and was transferred to the recovery    room in good condition.  Next,  utilization of Dr. Dewitt was necessary for   patient positioning, holding, retracting, provisional and definitive    fixation, wound closure, dressing and splint placement.  He was present    throughout the entire procedure.

## 2020-01-21 NOTE — DISCHARGE INSTRUCTIONS
Discharge Instructions    Discharged to home by car with relative. Discharged via wheelchair, hospital escort: Yes.  Special equipment needed: Walker    Be sure to schedule a follow-up appointment with your primary care doctor or any specialists as instructed.     Discharge Plan:   Diet Plan: Discussed  Activity Level: Discussed  Confirmed Follow up Appointment: Patient to Call and Schedule Appointment  Confirmed Symptoms Management: Discussed  Medication Reconciliation Updated: Yes  Influenza Vaccine Indication: Not indicated: Previously immunized this influenza season and > 8 years of age    I understand that a diet low in cholesterol, fat, and sodium is recommended for good health. Unless I have been given specific instructions below for another diet, I accept this instruction as my diet prescription.   Other diet: regular    Special Instructions: Discharge instructions for the Orthopedic Patient    Follow up with Primary Care Physician within 2 weeks of discharge to home, regarding:  Review of medications and diagnostic testing.  Surveillance for medical complications.  Workup and treatment of osteoporosis, if appropriate.     -Is this a Joint Replacement patient? Yes, total ankle arthroplasty      -Is this patient being discharged with medication to prevent blood clots?  Yes, Xarelto Rivaroxaban oral tablets  What is this medicine?  RIVAROXABAN (ri va ELVIRA a ban) is an anticoagulant (blood thinner). It is used to treat blood clots in the lungs or in the veins. It is also used after knee or hip surgeries to prevent blood clots. It is also used to lower the chance of stroke in people with a medical condition called atrial fibrillation.  This medicine may be used for other purposes; ask your health care provider or pharmacist if you have questions.  COMMON BRAND NAME(S): Xarelto, Xarelto Starter Pack  What should I tell my health care provider before I take this medicine?  They need to know if you have any of these  conditions:  -bleeding disorders  -bleeding in the brain  -blood in your stools (black or tarry stools) or if you have blood in your vomit  -history of stomach bleeding  -kidney disease  -liver disease  -low blood counts, like low white cell, platelet, or red cell counts  -recent or planned spinal or epidural procedure  -take medicines that treat or prevent blood clots  -an unusual or allergic reaction to rivaroxaban, other medicines, foods, dyes, or preservatives  -pregnant or trying to get pregnant  -breast-feeding  How should I use this medicine?  Take this medicine by mouth with a glass of water. Follow the directions on the prescription label. Take your medicine at regular intervals. Do not take it more often than directed. Do not stop taking except on your doctor's advice. Stopping this medicine may increase your risk of a blood clot. Be sure to refill your prescription before you run out of medicine.  If you are taking this medicine after hip or knee replacement surgery, take it with or without food. If you are taking this medicine for atrial fibrillation, take it with your evening meal. If you are taking this medicine to treat blood clots, take it with food at the same time each day. If you are unable to swallow your tablet, you may crush the tablet and mix it in applesauce. Then, immediately eat the applesauce. You should eat more food right after you eat the applesauce containing the crushed tablet.  Talk to your pediatrician regarding the use of this medicine in children. Special care may be needed.  Overdosage: If you think you have taken too much of this medicine contact a poison control center or emergency room at once.  NOTE: This medicine is only for you. Do not share this medicine with others.  What if I miss a dose?  If you take your medicine once a day and miss a dose, take the missed dose as soon as you remember. If you take your medicine twice a day and miss a dose, take the missed dose  immediately. In this instance, 2 tablets may be taken at the same time. The next day you should take 1 tablet twice a day as directed.  What may interact with this medicine?  Do not take this medicine with any of the following medications:  -defibrotide  This medicine may also interact with the following medications:  -aspirin and aspirin-like medicines  -certain antibiotics like erythromycin, azithromycin, and clarithromycin  -certain medicines for fungal infections like ketoconazole and itraconazole  -certain medicines for irregular heart beat like amiodarone, quinidine, dronedarone  -certain medicines for seizures like carbamazepine, phenytoin  -certain medicines that treat or prevent blood clots like warfarin, enoxaparin, and dalteparin  -conivaptan  -diltiazem  -felodipine  -indinavir  -lopinavir; ritonavir  -NSAIDS, medicines for pain and inflammation, like ibuprofen or naproxen  -ranolazine  -rifampin  -ritonavir  -SNRIs, medicines for depression, like desvenlafaxine, duloxetine, levomilnacipran, venlafaxine  -SSRIs, medicines for depression, like citalopram, escitalopram, fluoxetine, fluvoxamine, paroxetine, sertraline  -Jensen's wort  -verapamil  This list may not describe all possible interactions. Give your health care provider a list of all the medicines, herbs, non-prescription drugs, or dietary supplements you use. Also tell them if you smoke, drink alcohol, or use illegal drugs. Some items may interact with your medicine.  What should I watch for while using this medicine?  Visit your doctor or health care professional for regular checks on your progress.  Notify your doctor or health care professional and seek emergency treatment if you develop breathing problems; changes in vision; chest pain; severe, sudden headache; pain, swelling, warmth in the leg; trouble speaking; sudden numbness or weakness of the face, arm or leg. These can be signs that your condition has gotten worse.  If you are going  to have surgery or other procedure, tell your doctor that you are taking this medicine.  What side effects may I notice from receiving this medicine?  Side effects that you should report to your doctor or health care professional as soon as possible:  -allergic reactions like skin rash, itching or hives, swelling of the face, lips, or tongue  -back pain  -redness, blistering, peeling or loosening of the skin, including inside the mouth  -signs and symptoms of bleeding such as bloody or black, tarry stools; red or dark-brown urine; spitting up blood or brown material that looks like coffee grounds; red spots on the skin; unusual bruising or bleeding from the eye, gums, or nose  Side effects that usually do not require medical attention (report to your doctor or health care professional if they continue or are bothersome):  -dizziness  -muscle pain  This list may not describe all possible side effects. Call your doctor for medical advice about side effects. You may report side effects to FDA at 3-564-FDA-5953.  Where should I keep my medicine?  Keep out of the reach of children.  Store at room temperature between 15 and 30 degrees C (59 and 86 degrees F). Throw away any unused medicine after the expiration date.  NOTE: This sheet is a summary. It may not cover all possible information. If you have questions about this medicine, talk to your doctor, pharmacist, or health care provider.  © 2018 [employer] (2017-09-06 16:29:33)      · Is patient discharged on Warfarin / Coumadin?   No     Ankle Arthroscopy, Care After  Refer to this sheet in the next few weeks. These instructions provide you with information on caring for yourself after your procedure. Your health care provider may also give you more specific instructions. Your treatment has been planned according to current medical practices, but problems sometimes occur. Call your health care provider if you have any problems or questions after your  procedure.  WHAT TO EXPECT AFTER THE PROCEDURE  After your procedure, it is typical to have the following sensations:  · Your ankle may be swollen, stiff, and painful.  · You may be constipated from the pain medicine given.  HOME CARE INSTRUCTIONS  · Keep your ankle elevated and wrapped to keep swelling down and to decrease pain. This also speeds healing.  · Apply ice to the injured area:  ¨ Put ice in a plastic bag.  ¨ Place a towel between your skin and the bag.  ¨ Leave the ice on for 20 minutes, 2-3 times a day.  · If you are constipated, drink 6-8 glasses of water a day and eat fruits and vegetables.  · Remove the dressings as directed and shower as directed by your health care provider.  · If physical therapy and exercises are prescribed by your health care provider, do them as directed.  · You will be given medicine to control the pain. Take all medicines as directed by your health care provider.  SEEK MEDICAL CARE IF:  · You have a fever.  · You have pus coming from your incision site.  · You notice a bad smell from your incision site.  · Your incision site breaks open after your stitches or tape has been removed.  · You have numbness in your foot or toes that gets worse.  SEEK IMMEDIATE MEDICAL CARE IF:   · You have chest pain or shortness of breath.  · You have ankle pain that is not relieved with pain medicine.      This information is not intended to replace advice given to you by your health care provider. Make sure you discuss any questions you have with your health care provider.     Document Released: 10/08/2014 Document Revised: 01/08/2016 Document Reviewed: 10/08/2014  [employer] Interactive Patient Education ©2016 [employer]      Depression / Suicide Risk    As you are discharged from this [facility] facility, it is important to learn how to keep safe from harming yourself.    Recognize the warning signs:  · Abrupt changes in personality, positive or negative- including increase in energy    · Giving away possessions  · Change in eating patterns- significant weight changes-  positive or negative  · Change in sleeping patterns- unable to sleep or sleeping all the time   · Unwillingness or inability to communicate  · Depression  · Unusual sadness, discouragement and loneliness  · Talk of wanting to die  · Neglect of personal appearance   · Rebelliousness- reckless behavior  · Withdrawal from people/activities they love  · Confusion- inability to concentrate     If you or a loved one observes any of these behaviors or has concerns about self-harm, here's what you can do:  · Talk about it- your feelings and reasons for harming yourself  · Remove any means that you might use to hurt yourself (examples: pills, rope, extension cords, firearm)  · Get professional help from the community (Mental Health, Substance Abuse, psychological counseling)  · Do not be alone:Call your Safe Contact- someone whom you trust who will be there for you.  · Call your local CRISIS HOTLINE 493-6836 or 141-518-1568  · Call your local Children's Mobile Crisis Response Team [place] (265) 417-4052 or www.[employer]  · Call the toll free National Suicide Prevention Hotlines   · National Suicide Prevention Lifeline 799-568-LTHW (9175)  · National Hope Line Network 800-SUICIDE (940-1140)

## 2020-01-21 NOTE — PROGRESS NOTES
Pt. discharged to home. Pt left unit via WC with escort.   IV pulled out.   Pt states he has a walker and crutches at home  Reviewed discharge instructions, follow up appointments, and prescription with the patient. Patient states understanding of all the instructions. Discharge instructions, prescriptions, and personal belongings with patient upon leaving.

## 2020-01-21 NOTE — ANESTHESIA POSTPROCEDURE EVALUATION
Patient: Lorena Mata Jr.    Procedure Summary     Date:  01/20/20 Room / Location:  [place] [address] / SURGERY [facility]    Anesthesia Start:  1426 Anesthesia Stop:  1713    Procedure:  ARTHROPLASTY, ANKLE, TOTAL, PLACEMENT OF MEDIAL MALLOUS SCREWS, GASTROC SOLEOUS RECESSION (Right Ankle) Diagnosis:  (PAIN IN RIGHT ANKLE, DEGENERATIVE JOINT DISEASE OF RIGHT ANKLE)    Surgeon:  Kareem Torres M.D. Responsible Provider:  Kg Vidales M.D.    Anesthesia Type:  general, peripheral nerve block ASA Status:  3          Final Anesthesia Type: general, peripheral nerve block  Last vitals  BP   Blood Pressure : 145/80    Temp   36.2 °C (97.2 °F)    Pulse   Pulse: 60   Resp   18    SpO2   98 %      Anesthesia Post Evaluation    Patient location during evaluation: PACU  Patient participation: complete - patient participated  Level of consciousness: awake and alert  Pain score: 5    Airway patency: patent  Anesthetic complications: no  Cardiovascular status: hemodynamically stable  Respiratory status: acceptable  Hydration status: euvolemic    PONV: none           Nurse Pain Score: 5 (NPRS)

## 2020-01-21 NOTE — ANESTHESIA TIME REPORT
Anesthesia Start and Stop Event Times     Date Time Event    1/20/2020 1416 Ready for Procedure     1426 Anesthesia Start     1713 Anesthesia Stop        Responsible Staff  01/20/20    Name Role Begin End    Gaetano Galvan M.D. Anesth 1426 1607    Kg Vidales M.D. Anesth 1607 1713        Preop Diagnosis (Free Text):  Pre-op Diagnosis     PAIN IN RIGHT ANKLE, DEGENERATIVE JOINT DISEASE OF RIGHT ANKLE        Preop Diagnosis (Codes):    Post op Diagnosis  Degenerative joint disease of right ankle      Premium Reason  A. 3PM - 7AM    Comments:

## 2020-01-21 NOTE — CARE PLAN
Problem: Safety  Goal: Will remain free from falls  Outcome: PROGRESSING AS EXPECTED  Intervention: Implement fall precautions  Flowsheets (Taken 1/21/2020 0830)  Environmental Precautions: Personal Belongings, Wastebasket, Call Bell etc. in Easy Reach;Treaded Slipper Socks on Patient;Transferred to Stronger Side;Report Given to Other Health Care Providers Regarding Fall Risk;Bed in Low Position;Communication Sign for Patients & Families;Mobility Assessed & Appropriate Sign Placed  Note:   Safety precautions in place. Call light within reach. Bed is low and in locked position. Hourly rounding in place.       Problem: Discharge Barriers/Planning  Goal: Patient's continuum of care needs will be met  Outcome: PROGRESSING AS EXPECTED  Intervention: Assess potential discharge barriers on admission and throughout hospital stay  Note:   Pending PT greer

## 2024-04-08 NOTE — CARE PLAN
Problem: Pain Management  Goal: Pain level will decrease to patient's comfort goal  Outcome: PROGRESSING AS EXPECTED  Pt arrived from PACU via gurney. A/O X4 wife at bedside. Pt pain is 5/10. Pt is comfortable, ate 100% dinner, Pt is drinking fluids.      Problem: Safety  Goal: Will remain free from injury  Outcome: PROGRESSING AS EXPECTED  Goal: Will remain free from falls  Outcome: PROGRESSING AS EXPECTED  Fall precautions in place. Bed alarm  is on, fall sign, fall bractlet, non- skid socks are in place. Pt and wife educated on safety prevention. Wife will stay the night with Pt. Call light with in reach.      Medical Necessity Information: It is in your best interest to select a reason for this procedure from the list below. All of these items fulfill various CMS LCD requirements except the new and changing color options. Medical Necessity Clause: This procedure was medically necessary because the lesion that was treated was: Lab: [id number] Lab Facility: [id number] Detail Level: Detailed Was A Bandage Applied: Yes Size Of Lesion In Cm (Required): 0.1 Depth Of Shave: dermis Biopsy Method: Dermablade Anesthesia Type: 1% lidocaine with epinephrine Hemostasis: Drysol Wound Care: Petrolatum Render Path Notes In Note?: No Consent was obtained from the patient. The risks and benefits to therapy were discussed in detail. Specifically, the risks of infection, scarring, bleeding, prolonged wound healing, incomplete removal, allergy to anesthesia, nerve injury and recurrence were addressed. Prior to the procedure, the treatment site was clearly identified and confirmed by the patient. All components of Universal Protocol/PAUSE Rule completed. Post-Care Instructions: I reviewed with the patient in detail post-care instructions. Patient is to keep the biopsy site dry overnight, and then apply bacitracin twice daily until healed. Patient may apply hydrogen peroxide soaks to remove any crusting. Notification Instructions: Patient will be notified of pathology results. However, patient instructed to call the office if not contacted within 2 weeks. Billing Type: Third-Party Bill

## (undated) DEVICE — BLADE SURGICAL CLIPPER - (50EA/CA)

## (undated) DEVICE — DRAPE C-ARM LARGE 41IN X 74 IN - (10/BX 2BX/CA)

## (undated) DEVICE — BLADE SAW OSCILLATING 8.0 X 1.27 X 70MM

## (undated) DEVICE — KIT ANESTHESIA W/CIRCUIT & 3/LT BAG W/FILTER (20EA/CA)

## (undated) DEVICE — SET LEADWIRE 5 LEAD BEDSIDE DISPOSABLE ECG (1SET OF 5/EA)

## (undated) DEVICE — BLADE SAW RECIPROCATING 8.0 X 0.98 X 43MM

## (undated) DEVICE — DRESSING 3X8 ADAPTIC GAUZE - NON-ADHERING (36/PK 6PK/BX)

## (undated) DEVICE — TOURNIQUET CUFF 34 X 4 ONE PORT DISP - STERILE (10/BX)

## (undated) DEVICE — PACK LOWER EXTREMITY - (2/CA)

## (undated) DEVICE — STERI STRIP COMPOUND BENZOIN - TINCTURE 0.6ML WITH APPLICATOR (40EA/BX)

## (undated) DEVICE — SLEEVE, VASO, THIGH, MED

## (undated) DEVICE — GLOVE BIOGEL SZ 7.5 SURGICAL PF LTX - (50PR/BX 4BX/CA)

## (undated) DEVICE — TUBING CLEARLINK DUO-VENT - C-FLO (48EA/CA)

## (undated) DEVICE — CHLORAPREP 26 ML APPLICATOR - ORANGE TINT(25/CA)

## (undated) DEVICE — CONTAINER SPECIMEN BAG OR - STERILE 4 OZ W/LID (100EA/CA)

## (undated) DEVICE — ELECTRODE DUAL RETURN W/ CORD - (50/PK)

## (undated) DEVICE — GLOVE SZ 6 BIOGEL PI MICRO - PF LF (50PR/BX 4BX/CA)

## (undated) DEVICE — SODIUM CHL IRRIGATION 0.9% 1000ML (12EA/CA)

## (undated) DEVICE — GLOVE BIOGEL PI ORTHO SZ 7.5 PF LF (40PR/BX)

## (undated) DEVICE — GLOVE BIOGEL INDICATOR SZ 7.5 SURGICAL PF LTX - (50PR/BX 4BX/CA)

## (undated) DEVICE — DISPOSABLE WOUND VAC PICO 10 X 20 CM - WOUND CARE (3/CA)

## (undated) DEVICE — PADDING CAST 6 IN STERILE - 6 X 4 YDS (24/CA)

## (undated) DEVICE — SET EXTENSION WITH 2 PORTS (48EA/CA) ***PART #2C8610 IS A SUBSTITUTE*****

## (undated) DEVICE — MASK ANESTHESIA ADULT  - (100/CA)

## (undated) DEVICE — KIT EVACUATER 3 SPRING PVC LF 1/8 DRAIN SIZE (10EA/CA)"

## (undated) DEVICE — SUTURE 3-0 VICRYL PLUS SH - 8X 18 INCH (12/BX)

## (undated) DEVICE — ELECTRODE 850 FOAM ADHESIVE - HYDROGEL RADIOTRNSPRNT (50/PK)

## (undated) DEVICE — GOWN SURGEONS X-LARGE - DISP. (30/CA)

## (undated) DEVICE — GLOVE SZ 6.5 BIOGEL PI MICRO - PF LF (50PR/BX)

## (undated) DEVICE — SENSOR SPO2 NEO LNCS ADHESIVE (20/BX) SEE USER NOTES

## (undated) DEVICE — SUTURE 3-0 ETHILON PS-1 (36PK/BX)

## (undated) DEVICE — GOWN WARMING STANDARD FLEX - (30/CA)

## (undated) DEVICE — SPLINT PLASTER 5 IN X 30 IN - (50EA/BX 6BX/CA)

## (undated) DEVICE — NEPTUNE 4 PORT MANIFOLD - (20/PK)

## (undated) DEVICE — SYRINGE ASEPTO - (50EA/CA

## (undated) DEVICE — K-WIRE

## (undated) DEVICE — KIT ROOM DECONTAMINATION

## (undated) DEVICE — CANISTER SUCTION 3000ML MECHANICAL FILTER AUTO SHUTOFF MEDI-VAC NONSTERILE LF DISP  (40EA/CA)

## (undated) DEVICE — MASK, LARYNGEAL AIRWAY #4

## (undated) DEVICE — GLOVE BIOGEL PI INDICATOR SZ 6.5 SURGICAL PF LF - (50/BX 4BX/CA)

## (undated) DEVICE — GLOVE BIOGEL ECLIPSE  PF LATEX SIZE 6.5 (50PR/BX)

## (undated) DEVICE — Device

## (undated) DEVICE — CLOSURE SKIN STRIP 1/2 X 4 IN - (STERI STRIP) (50/BX 4BX/CA)

## (undated) DEVICE — LACTATED RINGERS INJ 1000 ML - (14EA/CA 60CA/PF)

## (undated) DEVICE — WRAP CO-FLEX 4IN X 5YD STERIL - SELF-ADHERENT (18/CA)

## (undated) DEVICE — PAD LAP STERILE 18 X 18 - (5/PK 40PK/CA)

## (undated) DEVICE — COTTON ROLL 1 POUND BIOSEAL - (5RL/CA)

## (undated) DEVICE — BLADE SURGICAL #15 - (50/BX 3BX/CA)

## (undated) DEVICE — GLOVE BIOGEL PI INDICATOR SZ 8.0 SURGICAL PF LF -(50/BX 4BX/CA)

## (undated) DEVICE — DRAPE LARGE 3 QUARTER - (20/CA)

## (undated) DEVICE — GLOVE BIOGEL ECLIPSE PF LATEX SIZE 8.0  (50PR/BX)

## (undated) DEVICE — SUCTION INSTRUMENT YANKAUER BULBOUS TIP W/O VENT (50EA/CA)

## (undated) DEVICE — STOCKINET BIAS 6 IN STERILE - (20/CA)

## (undated) DEVICE — DRAPE C ARMOR (12EA/CA)

## (undated) DEVICE — PROTECTOR ULNA NERVE - (36PR/CA)

## (undated) DEVICE — DRAPE STRLE REG TOWEL 18X24 - (10/BX 4BX/CA)"

## (undated) DEVICE — HEAD HOLDER JUNIOR/ADULT

## (undated) DEVICE — SUTURE GENERAL